# Patient Record
Sex: MALE | Race: WHITE | Employment: FULL TIME | URBAN - METROPOLITAN AREA
[De-identification: names, ages, dates, MRNs, and addresses within clinical notes are randomized per-mention and may not be internally consistent; named-entity substitution may affect disease eponyms.]

---

## 2017-03-26 DIAGNOSIS — E11.9 TYPE 2 DIABETES MELLITUS WITHOUT COMPLICATION (HCC): ICD-10-CM

## 2017-03-27 RX ORDER — SITAGLIPTIN 100 MG/1
TABLET, FILM COATED ORAL
Qty: 90 TABLET | Refills: 3 | Status: SHIPPED | OUTPATIENT
Start: 2017-03-27 | End: 2017-10-13 | Stop reason: ALTCHOICE

## 2017-03-27 RX ORDER — PIOGLITAZONEHYDROCHLORIDE 45 MG/1
TABLET ORAL
Qty: 90 TABLET | Refills: 3 | Status: SHIPPED | OUTPATIENT
Start: 2017-03-27 | End: 2017-10-13 | Stop reason: SDUPTHER

## 2017-04-04 ENCOUNTER — OFFICE VISIT (OUTPATIENT)
Dept: FAMILY MEDICINE CLINIC | Age: 47
End: 2017-04-04

## 2017-04-04 VITALS
DIASTOLIC BLOOD PRESSURE: 80 MMHG | SYSTOLIC BLOOD PRESSURE: 128 MMHG | BODY MASS INDEX: 35.03 KG/M2 | HEIGHT: 72 IN | HEART RATE: 100 BPM | RESPIRATION RATE: 16 BRPM | WEIGHT: 258.6 LBS

## 2017-04-04 DIAGNOSIS — E78.2 MIXED HYPERLIPIDEMIA: ICD-10-CM

## 2017-04-04 DIAGNOSIS — Z51.81 MEDICATION MONITORING ENCOUNTER: ICD-10-CM

## 2017-04-04 DIAGNOSIS — L84 CALLUS OF FOOT: ICD-10-CM

## 2017-04-04 DIAGNOSIS — Z79.4 TYPE 2 DIABETES MELLITUS WITHOUT COMPLICATION, WITH LONG-TERM CURRENT USE OF INSULIN (HCC): Primary | ICD-10-CM

## 2017-04-04 DIAGNOSIS — I10 ESSENTIAL HYPERTENSION: ICD-10-CM

## 2017-04-04 DIAGNOSIS — E83.52 HYPERCALCEMIA: ICD-10-CM

## 2017-04-04 DIAGNOSIS — J30.1 SEASONAL ALLERGIC RHINITIS DUE TO POLLEN: ICD-10-CM

## 2017-04-04 DIAGNOSIS — E11.9 TYPE 2 DIABETES MELLITUS WITHOUT COMPLICATION, WITH LONG-TERM CURRENT USE OF INSULIN (HCC): Primary | ICD-10-CM

## 2017-04-04 PROCEDURE — 99214 OFFICE O/P EST MOD 30 MIN: CPT | Performed by: FAMILY MEDICINE

## 2017-04-04 RX ORDER — VALSARTAN 160 MG/1
TABLET ORAL
Qty: 90 TABLET | Refills: 3 | Status: SHIPPED | OUTPATIENT
Start: 2017-04-04 | End: 2017-10-13 | Stop reason: ALTCHOICE

## 2017-04-04 RX ORDER — FENOFIBRATE 145 MG/1
145 TABLET, COATED ORAL DAILY
Qty: 90 TABLET | Refills: 1 | Status: SHIPPED | OUTPATIENT
Start: 2017-04-04 | End: 2017-09-09 | Stop reason: SDUPTHER

## 2017-04-04 RX ORDER — PIOGLITAZONEHYDROCHLORIDE 45 MG/1
45 TABLET ORAL DAILY
Qty: 90 TABLET | Refills: 1 | Status: SHIPPED | OUTPATIENT
Start: 2017-04-04 | End: 2017-10-13 | Stop reason: ALTCHOICE

## 2017-04-04 RX ORDER — SIMVASTATIN 40 MG
TABLET ORAL
Qty: 90 TABLET | Refills: 3 | Status: SHIPPED | OUTPATIENT
Start: 2017-04-04 | End: 2017-11-28 | Stop reason: ALTCHOICE

## 2017-04-04 ASSESSMENT — PATIENT HEALTH QUESTIONNAIRE - PHQ9
2. FEELING DOWN, DEPRESSED OR HOPELESS: 0
SUM OF ALL RESPONSES TO PHQ9 QUESTIONS 1 & 2: 0
SUM OF ALL RESPONSES TO PHQ QUESTIONS 1-9: 0
1. LITTLE INTEREST OR PLEASURE IN DOING THINGS: 0

## 2017-04-04 ASSESSMENT — ENCOUNTER SYMPTOMS
SHORTNESS OF BREATH: 0
GASTROINTESTINAL NEGATIVE: 1
EYE ITCHING: 1
COUGH: 0
WHEEZING: 0
ABDOMINAL PAIN: 0

## 2017-04-06 LAB
AVERAGE GLUCOSE: 194 MG/DL (ref 66–114)
CHOLESTEROL, TOTAL: 173 MG/DL
CHOLESTEROL/HDL RATIO: 3.7
CHOLESTEROL/HDL RATIO: 3.7
CHOLESTEROL: 173 MG/DL
CREATINE, URINE: 183.1 MG/DL
CREATININE, URINE: 183.1
HBA1C MFR BLD: 8.4 %
HBA1C MFR BLD: 8.4 % (ref 4.2–5.8)
HDLC SERPL-MCNC: 47 MG/DL (ref 35–70)
HDLC SERPL-MCNC: 47 MG/DL (ref 40–60)
LDL CHOLESTEROL CALCULATED: 95 MG/DL
LDL CHOLESTEROL CALCULATED: 95 MG/DL (ref 0–160)
LDL/HDL RATIO: 2
MICROALBUMIN/CREAT 24H UR: 70.8 MG/DL
MICROALBUMIN/CREAT 24H UR: 70.8 MG/G{CREAT}
MICROALBUMIN/CREAT UR-RTO: 387
MICROALBUMIN/CREAT UR-RTO: 387 UG/MG
TRIGL SERPL-MCNC: 155 MG/DL
TRIGL SERPL-MCNC: 155 MG/DL
VLDLC SERPL CALC-MCNC: 31 MG/DL
VLDLC SERPL CALC-MCNC: 31 MG/DL

## 2017-07-24 DIAGNOSIS — E11.9 TYPE 2 DIABETES MELLITUS WITHOUT COMPLICATION, WITH LONG-TERM CURRENT USE OF INSULIN (HCC): ICD-10-CM

## 2017-07-24 DIAGNOSIS — Z79.4 TYPE 2 DIABETES MELLITUS WITHOUT COMPLICATION, WITH LONG-TERM CURRENT USE OF INSULIN (HCC): ICD-10-CM

## 2017-07-28 DIAGNOSIS — E11.9 TYPE 2 DIABETES MELLITUS WITHOUT COMPLICATION (HCC): ICD-10-CM

## 2017-07-28 RX ORDER — INSULIN GLARGINE 100 [IU]/ML
INJECTION, SOLUTION SUBCUTANEOUS
Qty: 90 ML | Refills: 3 | Status: SHIPPED | OUTPATIENT
Start: 2017-07-28 | End: 2017-11-02 | Stop reason: SDUPTHER

## 2017-10-03 ENCOUNTER — TELEPHONE (OUTPATIENT)
Dept: FAMILY MEDICINE CLINIC | Age: 47
End: 2017-10-03

## 2017-10-10 LAB
BUN BLDV-MCNC: 45 MG/DL
CALCIUM SERPL-MCNC: 9.9 MG/DL
CHLORIDE BLD-SCNC: 102 MMOL/L
CO2: 25 MMOL/L
CREAT SERPL-MCNC: 3.4 MG/DL
GFR CALCULATED: 20
GLUCOSE BLD-MCNC: 139 MG/DL
POTASSIUM SERPL-SCNC: 5.2 MMOL/L
SODIUM BLD-SCNC: 137 MMOL/L

## 2017-10-13 ENCOUNTER — OFFICE VISIT (OUTPATIENT)
Dept: FAMILY MEDICINE CLINIC | Age: 47
End: 2017-10-13
Payer: COMMERCIAL

## 2017-10-13 VITALS
BODY MASS INDEX: 33.59 KG/M2 | RESPIRATION RATE: 20 BRPM | SYSTOLIC BLOOD PRESSURE: 112 MMHG | WEIGHT: 248 LBS | HEIGHT: 72 IN | DIASTOLIC BLOOD PRESSURE: 78 MMHG | TEMPERATURE: 99 F | HEART RATE: 88 BPM

## 2017-10-13 DIAGNOSIS — I10 ESSENTIAL HYPERTENSION: ICD-10-CM

## 2017-10-13 DIAGNOSIS — Z51.81 MEDICATION MONITORING ENCOUNTER: ICD-10-CM

## 2017-10-13 DIAGNOSIS — Z79.4 TYPE 2 DIABETES MELLITUS WITHOUT COMPLICATION, WITH LONG-TERM CURRENT USE OF INSULIN (HCC): Primary | ICD-10-CM

## 2017-10-13 DIAGNOSIS — E78.2 MIXED HYPERLIPIDEMIA: ICD-10-CM

## 2017-10-13 DIAGNOSIS — E11.9 TYPE 2 DIABETES MELLITUS WITHOUT COMPLICATION, WITH LONG-TERM CURRENT USE OF INSULIN (HCC): Primary | ICD-10-CM

## 2017-10-13 DIAGNOSIS — N17.9 AKI (ACUTE KIDNEY INJURY) (HCC): ICD-10-CM

## 2017-10-13 PROCEDURE — 99214 OFFICE O/P EST MOD 30 MIN: CPT | Performed by: FAMILY MEDICINE

## 2017-10-13 RX ORDER — SITAGLIPTIN 25 MG/1
100 TABLET, FILM COATED ORAL
COMMUNITY
Start: 2017-10-07 | End: 2017-11-02 | Stop reason: SINTOL

## 2017-10-13 RX ORDER — CARVEDILOL 12.5 MG/1
TABLET ORAL
COMMUNITY
Start: 2017-10-07 | End: 2017-11-28 | Stop reason: ALTCHOICE

## 2017-10-13 RX ORDER — FENOFIBRATE 160 MG/1
145 TABLET ORAL DAILY
COMMUNITY
End: 2018-02-22 | Stop reason: SDUPTHER

## 2017-10-13 RX ORDER — AMLODIPINE BESYLATE 10 MG/1
TABLET ORAL
COMMUNITY
Start: 2017-10-07 | End: 2017-11-28 | Stop reason: ALTCHOICE

## 2017-10-13 RX ORDER — VALSARTAN 160 MG/1
160 TABLET ORAL DAILY
COMMUNITY
End: 2017-10-13 | Stop reason: ALTCHOICE

## 2017-10-13 RX ORDER — ATORVASTATIN CALCIUM 20 MG/1
TABLET, FILM COATED ORAL
COMMUNITY
Start: 2017-10-07 | End: 2017-11-02 | Stop reason: CLARIF

## 2017-10-13 RX ORDER — CEFADROXIL 500 MG/1
CAPSULE ORAL
COMMUNITY
Start: 2017-10-07 | End: 2017-11-02

## 2017-10-13 RX ORDER — OXYCODONE HYDROCHLORIDE 5 MG/1
TABLET ORAL
Refills: 0 | COMMUNITY
Start: 2017-10-07 | End: 2017-11-02 | Stop reason: ALTCHOICE

## 2017-10-13 ASSESSMENT — ENCOUNTER SYMPTOMS
ROS SKIN COMMENTS: SEE HPI.
GASTROINTESTINAL NEGATIVE: 1
ALLERGIC/IMMUNOLOGIC NEGATIVE: 1
RESPIRATORY NEGATIVE: 1

## 2017-10-13 NOTE — PROGRESS NOTES
Subjective:      Patient ID: Yared Dia is a 55 y.o. male. HPI  Follow up of chronic conditions. Encounter Diagnoses   Name Primary?  Type 2 diabetes mellitus without complication, with long-term current use of insulin (HCC) Yes    Essential hypertension     Mixed hyperlipidemia     Medication monitoring encounter     OLIVA (acute kidney injury) ([facility] [address] surgery for scotal abscess      Patient is here after having been hospitalized for treatment of a scrotal abscess which had to be debrided and is now being allowed to close by secondary intention. I've spoken with his nephrologist who states that there was gangrene present as a consequence of his neglect of his diabetes. He also experienced acute renal injury secondary to the infection at one point had a creatinine around 6.0. Since then, quite a few of his medications have been stopped because of his renal injury and his diabetes is being controlled solely with basal insulin and immediate acting regular insulin. In the office today, a fingerstick A1c was 7.4%. Lab Results   Component Value Date    LABA1C 8.4 04/06/2017    LABA1C 8.4 (H) 04/05/2017    LABA1C 6.7 (H) 05/13/2016     Lab Results   Component Value Date    LDLCALC 95 04/06/2017    CREATININE 1.61 (H) 09/30/2017     HTN is stable with current medications. Pt has no medication side effects nor orthostatic symptoms. BP Readings from Last 3 Encounters:   10/13/17 112/78   04/04/17 128/80   11/30/16 (!) 150/89     Lipid values are stable currently. Cholesterol, Total (mg/dL)   Date Value   04/06/2017 173     HDL   Date Value   04/06/2017 47 mg/dL   02/27/2012 39 mg/dl     Triglycerides (mg/dL)   Date Value   04/06/2017 155     Lab Results   Component Value Date    LDLCALC 95 04/06/2017     The rest of this patient's conditions are stable. Past medical and surgical hx reviewed.   Past Medical History:   Diagnosis Date    OLIVA (acute kidney injury) ([address] surgery for scotal abscess 10/13/2017  Hyperlipidemia     Hypertension     Scrotal abscess 10/02/2017    Tobacco abuse     Type II or unspecified type diabetes mellitus without mention of complication, not stated as uncontrolled      Past Surgical History:   Procedure Laterality Date    ABSCESS DRAINAGE  10/02/2017    Scrotal    RECTAL SURGERY      fissure     Portions of this note were completed with a voice recording program.  Efforts were made to edit the dictations but occasionally words are mis-transcribed. Review of Systems   Constitutional: Negative. HENT: Negative. Respiratory: Negative. Cardiovascular: Negative. Negative for chest pain, palpitations and leg swelling. Gastrointestinal: Negative. Endocrine:        See HPI. Genitourinary:        See HPI. Musculoskeletal: Negative. Skin:        See HPI. Allergic/Immunologic: Negative. Neurological: Negative. Hematological: Negative. Psychiatric/Behavioral: Negative. All other systems reviewed and are negative. Objective:   Physical Exam   Constitutional: He is oriented to person, place, and time. He appears well-developed and well-nourished. HENT:   Right Ear: External ear normal.   Left Ear: External ear normal.   Nose: Nose normal.   Mouth/Throat: Oropharynx is clear and moist.   Eyes: Conjunctivae are normal.   Neck: No thyromegaly present. Cardiovascular: Normal rate, regular rhythm and normal heart sounds. Pulmonary/Chest: Effort normal and breath sounds normal.   Abdominal: Soft. Bowel sounds are normal.   Genitourinary:         Musculoskeletal: He exhibits no edema. Lymphadenopathy:     He has no cervical adenopathy. Neurological: He is alert and oriented to person, place, and time. Skin: Skin is warm and dry. Psychiatric: He has a normal mood and affect. Nursing note and vitals reviewed. Assessment:      1. Type 2 diabetes mellitus without complication, with long-term current use of insulin ([address]     2.  Essential hypertension     3. Mixed hyperlipidemia     4. Medication monitoring encounter     5. OLIVA (acute kidney injury) ([facility] [address] surgery for scotal abscess             Plan:      No orders of the defined types were placed in this encounter. \  Medications Discontinued During This Encounter   Medication Reason    fenofibrate (TRICOR) 145 MG tablet Dose adjustment    fenofibrate (TRICOR) 145 MG tablet Dose adjustment    valsartan (DIOVAN) 160 MG tablet Therapy completed    pioglitazone (ACTOS) 45 MG tablet Duplicate Order    JANUVIA 100 MG tablet Discontinued by another clinician    pioglitazone (ACTOS) 45 MG tablet Discontinued by another clinician    Exenatide (BYDUREON) 2 MG PEN Discontinued by another clinician    valsartan (DIOVAN) 160 MG tablet Discontinued by another clinician     Current Outpatient Prescriptions   Medication Sig Dispense Refill    amLODIPine (NORVASC) 10 MG tablet       atorvastatin (LIPITOR) 20 MG tablet       carvedilol (COREG) 12.5 MG tablet       cefadroxil (DURICEF) 500 MG capsule       oxyCODONE (ROXICODONE) 5 MG immediate release tablet TK 1 T PO Q 6 H PRN P  0    JANUVIA 25 MG tablet       fenofibrate 160 MG tablet Take 160 mg by mouth daily      Multiple Vitamins-Minerals (MULTIVITAMIN ADULTS PO) Take by mouth      insulin glargine (LANTUS SOLOSTAR) 100 UNIT/ML injection pen Inject 50 Units into the skin 2 times daily 18 Pen 2    glucose blood VI test strips (ONE TOUCH ULTRA TEST) strip Test 4x daily AD. Dx: EII. 9 on insulin 400 each 3    Insulin Pen Needle (B-D ULTRAFINE III SHORT PEN) 31G X 8 MM MISC Use as directed for insulin 200 each 3    LANTUS SOLOSTAR 100 UNIT/ML injection pen INJECT 50 UNITS UNDER THE  SKIN TWO TIMES A DAY 90 mL 3    simvastatin (ZOCOR) 40 MG tablet TAKE 1 TABLET NIGHTLY 90 tablet 3    metFORMIN (GLUCOPHAGE) 850 MG tablet TAKE 1 TABLET IN THE       MORNING AND 2 TABLETS IN   THE EVENING 270 tablet 3    acetaminophen (TYLENOL) 500 MG tablet

## 2017-10-13 NOTE — PATIENT INSTRUCTIONS
Continue current medications as ordered by Dr Renan Buckley. Continue present dressing changes. Keep record of  Home BS readings. Recheck appointment 6 weeks.

## 2017-10-16 ENCOUNTER — TELEPHONE (OUTPATIENT)
Dept: FAMILY MEDICINE CLINIC | Age: 47
End: 2017-10-16

## 2017-10-16 DIAGNOSIS — E78.2 MIXED HYPERLIPIDEMIA: ICD-10-CM

## 2017-10-16 DIAGNOSIS — Z79.4 TYPE 2 DIABETES MELLITUS WITHOUT COMPLICATION, WITH LONG-TERM CURRENT USE OF INSULIN (HCC): Primary | ICD-10-CM

## 2017-10-16 DIAGNOSIS — I10 ESSENTIAL HYPERTENSION: ICD-10-CM

## 2017-10-16 DIAGNOSIS — E11.9 TYPE 2 DIABETES MELLITUS WITHOUT COMPLICATION, WITH LONG-TERM CURRENT USE OF INSULIN (HCC): Primary | ICD-10-CM

## 2017-10-16 DIAGNOSIS — N17.9 AKI (ACUTE KIDNEY INJURY) (HCC): ICD-10-CM

## 2017-10-16 NOTE — TELEPHONE ENCOUNTER
Patient is calling in wanting to know what Dr DMITRIY MARIANO [facility] and Dr Veronica Zelaya had decided about his medication. His blood sugars are running high.   Please advise

## 2017-10-16 NOTE — TELEPHONE ENCOUNTER
We agree that he needs to be seen and managed by the diabetic clinic again. He has to have intensive monitoring of his blood sugars as well as renal function. Find out which diabetic clinic he wants to be established with.

## 2017-10-19 ENCOUNTER — TELEPHONE (OUTPATIENT)
Dept: FAMILY MEDICINE CLINIC | Age: 47
End: 2017-10-19

## 2017-11-02 ENCOUNTER — TELEPHONE (OUTPATIENT)
Dept: FAMILY MEDICINE CLINIC | Age: 47
End: 2017-11-02

## 2017-11-02 ENCOUNTER — TELEPHONE (OUTPATIENT)
Dept: PHARMACY | Age: 47
End: 2017-11-02

## 2017-11-02 ENCOUNTER — HOSPITAL ENCOUNTER (OUTPATIENT)
Dept: PHARMACY | Age: 47
Setting detail: THERAPIES SERIES
Discharge: HOME | End: 2017-11-02
Payer: COMMERCIAL

## 2017-11-02 VITALS — WEIGHT: 254.38 LBS | BODY MASS INDEX: 34.5 KG/M2

## 2017-11-02 PROCEDURE — G0108 DIAB MANAGE TRN  PER INDIV: HCPCS | Performed by: REGISTERED NURSE

## 2017-11-02 RX ORDER — PIOGLITAZONEHYDROCHLORIDE 45 MG/1
45 TABLET ORAL DAILY
COMMUNITY
End: 2018-02-22 | Stop reason: ALTCHOICE

## 2017-11-02 NOTE — TELEPHONE ENCOUNTER
elizabeth nelson from med management needs a verbal order for humalog for patient. He is in the office now.

## 2017-11-02 NOTE — PROGRESS NOTES
Diabetes Clinic at [address] [place], [address]  [phone] (phone)  [phone] (fax)    Patient ID: Meliza Arellano 1970  Referring Provider: Nikki Frias     Patient's name and  were verified. Subjective:    He presents for His follow-up diabetic visit. He has type 2 diabetes mellitus. Home regimen includes: insulin  biguanide  TZD  [id number]-[id number] He is compliant most of the time. Assessment:     Lab Results   Component Value Date    LABA1C 8.4 2017    BUN 25 2017    CREATININE 1.61 2017     There were no vitals filed for this visit. Wt Readings from Last 3 Encounters:   10/13/17 248 lb (112.5 kg)   17 258 lb 9.6 oz (117.3 kg)   16 250 lb (113.4 kg)     Ht Readings from Last 3 Encounters:   10/13/17 6' (1.829 m)   17 6' (1.829 m)   16 6' (1.829 m)       Glucose at 4 hrs PPD today resulted at 168mg/dl  Current monitoring regimen: home blood tests - 4 times daily  Home blood sugar trends:   Any episodes of hypoglycemia? no  Previous visit with dietician: yes - in past  Current diet: on average, 3 meals per day  Current exercise: none. Works [employer] alternate shifts  Eye exam current (within one year): unknown  Any history of foot problems? n/a  Last foot exam: deferred  Immunizations up to date: recommended  Taking ASA:  Yes - If not why? Appropriate for use of MyChart Glucose Grid:  Yes    Focus:     Ray Carlin shared that he had an episode of acute failure after a surgery and episode of infection within the last month. Was seen by Dr. Lila Vinson, but diabetes meds had been managed by Dr. Nikki Frias. While in hospital, all orals were stopped due to decreased kidney function. Due to uncertainty, orals were restarted by the patient. Per pharm d, stop bydureon, januvia, metformin, for now until kidney function can be re evaluated. Current GFR at 20mg. Will ask Dr. Nikki Frias to begin humalog per scale with clinic to manage and continue lantus and actos.  Future

## 2017-11-02 NOTE — TELEPHONE ENCOUNTER
Verbal order received to begin Humalog kwikpen per scale 2for BS >140mg. Clinic to titrate. Voucher provided for 5 pens. Pt instructed on how to use humalog safely. Uses [facility] [employer].

## 2017-11-08 ENCOUNTER — HOSPITAL ENCOUNTER (OUTPATIENT)
Dept: PHARMACY | Age: 47
Setting detail: THERAPIES SERIES
Discharge: HOME | End: 2017-11-08
Payer: COMMERCIAL

## 2017-11-08 VITALS
SYSTOLIC BLOOD PRESSURE: 148 MMHG | DIASTOLIC BLOOD PRESSURE: 78 MMHG | WEIGHT: 253.38 LBS | BODY MASS INDEX: 34.36 KG/M2

## 2017-11-08 DIAGNOSIS — Z79.4 TYPE 2 DIABETES MELLITUS WITH COMPLICATION, WITH LONG-TERM CURRENT USE OF INSULIN (HCC): Primary | ICD-10-CM

## 2017-11-08 DIAGNOSIS — E11.8 TYPE 2 DIABETES MELLITUS WITH COMPLICATION, WITH LONG-TERM CURRENT USE OF INSULIN (HCC): Primary | ICD-10-CM

## 2017-11-08 LAB
BUN BLDV-MCNC: 30 MG/DL
CALCIUM SERPL-MCNC: 11.7 MG/DL
CHLORIDE BLD-SCNC: 102 MMOL/L
CO2: 28 MMOL/L
CREAT SERPL-MCNC: 1.5 MG/DL
GFR CALCULATED: 50
GLUCOSE BLD-MCNC: 88 MG/DL
POTASSIUM SERPL-SCNC: 4.4 MMOL/L
SODIUM BLD-SCNC: 137 MMOL/L

## 2017-11-08 PROCEDURE — G0108 DIAB MANAGE TRN  PER INDIV: HCPCS | Performed by: REGISTERED NURSE

## 2017-11-08 NOTE — PROGRESS NOTES
Diabetes Clinic at [address] [place], [address]  [phone] (phone)  [phone] (fax)    Patient ID: Arely Juarez 1970  Referring Provider: Dr. John Elliott     Patient's name and  were verified. Subjective:    He presents for His follow-up diabetic visit. He has type 2 diabetes mellitus. Home regimen includes: insulin  TZD He is compliant most of the time. Assessment:     Lab Results   Component Value Date    LABA1C 8.4 2017    BUN 45 10/10/2017    CREATININE 3.4 10/10/2017     Vitals:    17 1306   BP: (!) 148/78   Weight: 253 lb 6 oz (114.9 kg)     Wt Readings from Last 3 Encounters:   17 253 lb 6 oz (114.9 kg)   17 254 lb 6 oz (115.4 kg)   10/13/17 248 lb (112.5 kg)     Ht Readings from Last 3 Encounters:   10/13/17 6' (1.829 m)   17 6' (1.829 m)   16 6' (1.829 m)       Glucose at 1 hrs PPD today resulted at 112mg/dl  Current monitoring regimen: home blood tests - 4 times daily. Since starting Humalog per scale      DATE FBS  HR PPD PRE-LUNCH  HR PPD PRE-DINNER  HR PPD BED   11/3 240  300  149     11-4 239  242  247      263    273      247(6) 211 247(6)  247 304(8)     219(6)  150 269(9) 278(9)      138  125/112                                             Any episodes of hypoglycemia? no  Previous visit with dietician: yes - in past  Current diet: on average, 3 meals per day, smaller portions. Does get up and eat during the night? Current exercise: walking  Eye exam current (within one year): yes. 2017- non proliferative retinopathy  Any history of foot problems? no  Last foot exam: deferred  Immunizations up to date: recommended  Taking ASA:  no - If not why? Appropriate for use of MyChart Glucose Grid:  Yes    Focus:     Mary Mcgill has been sgm and using insulin as directed per scale and BS have remained over 200mg until today. Why? .  C Peptide in  was 0.3, he is off all orals, except actos and he is eating less, but this am had a nutrigrain bar and regular starbucks cappachino (45gms). Per pharm d repeat BMP for safety. Last creatinine was 3.4 with GFR of 20mg. Will consider a base dose of humalog for a typical meal if BS go back up. Teaching done rt pattern management, renal disease, safety, and travel. Will be in [address] in early December. Agreeable to plan. DSME PLAN:   Discussed general issues about diabetes pathophysiology and management. Counseling at today's visit: focused on the need to adhere to the prescribed ADA diet and reminded to check sugars regularly and to bring readings in at the time of the next visit. Discussed ways to avoid symptomatic hypoglycemia. Discussed sick day management. Meter download, medications, PMH and nursing assessment reviewed with ROBERT Cruz, Pharm D. [facility] states He is willing to participate in this plan of care and verbalized understanding of all instructions provided. Teach back used to verify comprehension. Total time involved in direct patient education: 30 minutes. BMP order to be done ASAP   1. Continue to check the blood sugars before meals and bedtime. 2.  Continue same insulin and actos until we get updated chemistry. 3.  B/P 148/78.  4. In future if you continue to need scale after meals, may need to start 6units before meals if eating carbohydrates. 5.  Continue to use scale 2, #3 if BS are running consistently over 200mg. 6.  Call if any concerns. [facility] would appreciate an update after you see Dr. Monica Cade. Could he fax a copy of his note to Dr. Katelyn De Guzman or Diabetes Clinic for the chart.   RN 8 weeks

## 2017-11-09 LAB
ANION GAP SERPL CALCULATED.3IONS-SCNC: 11 MMOL/L
BUN BLDV-MCNC: 30 MG/DL (ref 10–20)
CALCIUM SERPL-MCNC: 11.7 MG/DL (ref 8.7–10.8)
CHLORIDE BLD-SCNC: 102 MMOL/L (ref 95–111)
CO2: 28 MMOL/L (ref 21–32)
CREAT SERPL-MCNC: 1.5 MG/DL (ref 0.5–1.3)
EGFR AFRICAN AMERICAN: 61
EGFR IF NONAFRICAN AMERICAN: 50
GLUCOSE: 88 MG/DL (ref 70–100)
POTASSIUM SERPL-SCNC: 4.4 MMOL/L (ref 3.5–5.4)
SODIUM BLD-SCNC: 137 MMOL/L (ref 134–147)

## 2017-11-28 ENCOUNTER — OFFICE VISIT (OUTPATIENT)
Dept: FAMILY MEDICINE CLINIC | Age: 47
End: 2017-11-28
Payer: COMMERCIAL

## 2017-11-28 VITALS
BODY MASS INDEX: 37.72 KG/M2 | SYSTOLIC BLOOD PRESSURE: 122 MMHG | HEIGHT: 71 IN | WEIGHT: 269.4 LBS | RESPIRATION RATE: 20 BRPM | HEART RATE: 100 BPM | DIASTOLIC BLOOD PRESSURE: 78 MMHG

## 2017-11-28 DIAGNOSIS — Z51.81 MEDICATION MONITORING ENCOUNTER: ICD-10-CM

## 2017-11-28 DIAGNOSIS — E78.2 MIXED HYPERLIPIDEMIA: ICD-10-CM

## 2017-11-28 DIAGNOSIS — Z79.4 TYPE 2 DIABETES MELLITUS WITHOUT COMPLICATION, WITH LONG-TERM CURRENT USE OF INSULIN (HCC): Primary | ICD-10-CM

## 2017-11-28 DIAGNOSIS — I10 ESSENTIAL HYPERTENSION: ICD-10-CM

## 2017-11-28 DIAGNOSIS — E11.9 TYPE 2 DIABETES MELLITUS WITHOUT COMPLICATION, WITH LONG-TERM CURRENT USE OF INSULIN (HCC): Primary | ICD-10-CM

## 2017-11-28 PROCEDURE — 3045F PR MOST RECENT HEMOGLOBIN A1C LEVEL 7.0-9.0%: CPT | Performed by: FAMILY MEDICINE

## 2017-11-28 PROCEDURE — G8417 CALC BMI ABV UP PARAM F/U: HCPCS | Performed by: FAMILY MEDICINE

## 2017-11-28 PROCEDURE — G8484 FLU IMMUNIZE NO ADMIN: HCPCS | Performed by: FAMILY MEDICINE

## 2017-11-28 PROCEDURE — 4004F PT TOBACCO SCREEN RCVD TLK: CPT | Performed by: FAMILY MEDICINE

## 2017-11-28 PROCEDURE — 99214 OFFICE O/P EST MOD 30 MIN: CPT | Performed by: FAMILY MEDICINE

## 2017-11-28 PROCEDURE — G8427 DOCREV CUR MEDS BY ELIG CLIN: HCPCS | Performed by: FAMILY MEDICINE

## 2017-11-28 ASSESSMENT — ENCOUNTER SYMPTOMS
GASTROINTESTINAL NEGATIVE: 1
ALLERGIC/IMMUNOLOGIC NEGATIVE: 1
RESPIRATORY NEGATIVE: 1

## 2017-11-28 NOTE — PROGRESS NOTES
reviewed. Past Medical History:   Diagnosis Date    OLIVA (acute kidney injury) ([facility] [address] surgery for scotal abscess 10/13/2017    Hyperlipidemia     Hypertension     Scrotal abscess 10/02/2017    Tobacco abuse     Type II or unspecified type diabetes mellitus without mention of complication, not stated as uncontrolled      Past Surgical History:   Procedure Laterality Date    ABSCESS DRAINAGE  10/02/2017    Scrotal    RECTAL SURGERY      fissure     Portions of this note were completed with a voice recording program.  Efforts were made to edit the dictations but occasionally words are mis-transcribed. Review of Systems   Constitutional: Negative. HENT: Negative. Respiratory: Negative. Cardiovascular: Negative. Gastrointestinal: Negative. Endocrine: Negative. Negative for polydipsia, polyphagia and polyuria. Genitourinary: Negative. Negative for difficulty urinating, frequency and urgency. Musculoskeletal: Positive for arthralgias. Skin: Negative. Negative for rash. Allergic/Immunologic: Negative. Neurological: Negative. Negative for dizziness, light-headedness and headaches. Hematological: Negative. Psychiatric/Behavioral: Negative. All other systems reviewed and are negative. Objective:   Physical Exam   Constitutional: He is oriented to person, place, and time. He appears well-developed and well-nourished. HENT:   Right Ear: External ear normal.   Left Ear: External ear normal.   Nose: Nose normal.   Mouth/Throat: Oropharynx is clear and moist.   Eyes: Conjunctivae are normal.   Neck: No thyromegaly present. Cardiovascular: Normal rate, regular rhythm, normal heart sounds and intact distal pulses. Exam reveals no gallop. No murmur heard. Pulmonary/Chest: Effort normal and breath sounds normal.   Abdominal: Soft. Bowel sounds are normal.   Musculoskeletal: He exhibits no edema. Lymphadenopathy:     He has no cervical adenopathy.    Neurological: He is alert and oriented to person, place, and time. No cranial nerve deficit. Skin: Skin is warm and dry. No rash noted. Psychiatric: He has a normal mood and affect. Nursing note and vitals reviewed. Assessment:      1. Type 2 diabetes mellitus without complication, with long-term current use of insulin (HCC)  Hemoglobin A1C   2. Essential hypertension     3. Mixed hyperlipidemia     4. Medication monitoring encounter  Hemoglobin A1C           Plan:      Orders Placed This Encounter   Procedures    Hemoglobin A1C     STANDING ORDER: A1c every 3 months for 1 yr. Dx: E11.9  DM2 without complications. Standing Status:   Future     Standing Expiration Date:   11/28/2018     Medications Discontinued During This Encounter   Medication Reason    amLODIPine (NORVASC) 10 MG tablet Therapy completed    carvedilol (COREG) 12.5 MG tablet Therapy completed    metFORMIN (GLUCOPHAGE) 850 MG tablet Therapy completed    simvastatin (ZOCOR) 40 MG tablet Therapy completed     Current Outpatient Prescriptions   Medication Sig Dispense Refill    pioglitazone (ACTOS) 45 MG tablet Take 45 mg by mouth daily      insulin lispro (HUMALOG KWIKPEN) 100 UNIT/ML pen Inject per group 2 sliding scale three times daily 5 Pen 3    fenofibrate 160 MG tablet Take 160 mg by mouth daily      Multiple Vitamins-Minerals (MULTIVITAMIN ADULTS PO) Take by mouth      insulin glargine (LANTUS SOLOSTAR) 100 UNIT/ML injection pen Inject 50 Units into the skin 2 times daily 18 Pen 2    Insulin Pen Needle (B-D ULTRAFINE III SHORT PEN) 31G X 8 MM MISC Use as directed for insulin 200 each 3    acetaminophen (TYLENOL) 500 MG tablet Take 1,000 mg by mouth every 6 hours as needed for Pain       No current facility-administered medications for this visit. Continue present medications. Keep tracking with diabetes clinic. Discussed use, benefit, and side effects of prescribed medications. Barriers to compliance discussed.   All patient

## 2017-11-28 NOTE — PROGRESS NOTES
Chronic Disease Visit Information    BP Readings from Last 3 Encounters:   11/08/17 (!) 148/78   10/13/17 112/78   04/04/17 128/80          Hemoglobin A1C (%)   Date Value   04/06/2017 8.4   04/05/2017 8.4 (H)   05/13/2016 6.7 (H)     LDL Calculated (mg/dL)   Date Value   04/06/2017 95     HDL   Date Value   04/06/2017 47 mg/dL   02/27/2012 39 mg/dl     BUN (mg/dl)   Date Value   11/08/2017 30 (H)     CREATININE (mg/dl)   Date Value   11/08/2017 1.5 (H)     Glucose (mg/dL)   Date Value   11/08/2017 88            Have you changed or started any medications since your last visit including any over-the-counter medicines, vitamins, or herbal medicines? no   Are you having any side effects from any of your medications? -  no  Have you stopped taking any of your medications? Is so, why? -  yes - tx complete    Have you seen any other physician or provider since your last visit? Yes - Records Obtained  Have you had any other diagnostic tests since your last visit? Yes - Records Obtained  Have you been seen in the emergency room and/or had an admission to a hospital since we last saw you? No   Have you had your annual diabetic retinal (eye) exam? Yes - Records Requested  Have you had your routine dental cleaning in the past 6 months? No     Have you activated your [employer] account? If not, what are your barriers?  Yes     Patient Care Team:  Alva Srinivasan MD as PCP - General (Family Medicine)         Medical History Review  Past Medical, Family, and Social History reviewed and does contribute to the patient presenting condition    Health Maintenance   Topic Date Due    DTaP/Tdap/Td vaccine (1 - Tdap) 11/08/1989    Pneumococcal med risk (1 of 1 - PPSV23) 11/08/1989    Flu vaccine (1) 09/01/2017    Diabetic retinal exam  01/25/2018    Diabetic foot exam  04/04/2018    Diabetic hemoglobin A1C test  04/06/2018    Diabetic microalbuminuria test  04/06/2018    Lipid screen  04/06/2018    HIV screen  Addressed

## 2017-12-21 ENCOUNTER — OFFICE VISIT (OUTPATIENT)
Dept: FAMILY MEDICINE CLINIC | Age: 47
End: 2017-12-21
Payer: COMMERCIAL

## 2017-12-21 VITALS
BODY MASS INDEX: 35.78 KG/M2 | HEIGHT: 72 IN | TEMPERATURE: 98 F | HEART RATE: 84 BPM | DIASTOLIC BLOOD PRESSURE: 70 MMHG | SYSTOLIC BLOOD PRESSURE: 116 MMHG | RESPIRATION RATE: 16 BRPM | WEIGHT: 264.2 LBS

## 2017-12-21 DIAGNOSIS — J01.90 ACUTE RHINOSINUSITIS: Primary | ICD-10-CM

## 2017-12-21 PROCEDURE — 4004F PT TOBACCO SCREEN RCVD TLK: CPT | Performed by: FAMILY MEDICINE

## 2017-12-21 PROCEDURE — G8484 FLU IMMUNIZE NO ADMIN: HCPCS | Performed by: FAMILY MEDICINE

## 2017-12-21 PROCEDURE — G8427 DOCREV CUR MEDS BY ELIG CLIN: HCPCS | Performed by: FAMILY MEDICINE

## 2017-12-21 PROCEDURE — G8417 CALC BMI ABV UP PARAM F/U: HCPCS | Performed by: FAMILY MEDICINE

## 2017-12-21 PROCEDURE — 99213 OFFICE O/P EST LOW 20 MIN: CPT | Performed by: FAMILY MEDICINE

## 2017-12-21 RX ORDER — AMOXICILLIN AND CLAVULANATE POTASSIUM 875; 125 MG/1; MG/1
1 TABLET, FILM COATED ORAL 2 TIMES DAILY
Qty: 14 TABLET | Refills: 0 | Status: SHIPPED | OUTPATIENT
Start: 2017-12-21 | End: 2017-12-28

## 2017-12-21 RX ORDER — VALSARTAN 160 MG/1
TABLET ORAL
COMMUNITY
Start: 2017-12-15 | End: 2018-02-22 | Stop reason: SDUPTHER

## 2017-12-21 ASSESSMENT — ENCOUNTER SYMPTOMS
COUGH: 1
SINUS PAIN: 1
SINUS PRESSURE: 1
WHEEZING: 0
SHORTNESS OF BREATH: 0
GASTROINTESTINAL NEGATIVE: 1

## 2017-12-21 NOTE — PROGRESS NOTES
Subjective:      Patient ID: Saskia Mejia is a 52 y.o. male. HPI:    Chief Complaint   Patient presents with    Head Congestion    Chest Congestion    Cough     Pt here for head and sinus congestion for the last 2 weeks. Started in sinus and now moving to chest.  No fevers. Using Sudafed and Afrin NS. No wheezing. Patient Active Problem List   Diagnosis    DM [facility]diabetes mellitus[facility] ([facility] [address]    HTN (hypertension)    Hyperlipidemia    Medication monitoring encounter    Callus of foot, bilateral    Hypercalcemia    Allergic rhinitis    OLIVA (acute kidney injury) ([facility] [address] surgery for scotal abscess     Past Surgical History:   Procedure Laterality Date    ABSCESS DRAINAGE  10/02/2017    Scrotal    RECTAL SURGERY      fissure     Prior to Admission medications    Medication Sig Start Date End Date Taking? Authorizing Provider   SIMVASTATIN PO Take by mouth   Yes Historical Provider, MD   valsartan (DIOVAN) 160 MG tablet  12/15/17  Yes Historical Provider, MD   pioglitazone (ACTOS) 45 MG tablet Take 45 mg by mouth daily   Yes Historical Provider, MD   insulin lispro (HUMALOG KWIKPEN) 100 UNIT/ML pen Inject per group 2 sliding scale three times daily 11/2/17  Yes Radha Evans MD   fenofibrate 160 MG tablet Take 160 mg by mouth daily   Yes Historical Provider, MD   Multiple Vitamins-Minerals (MULTIVITAMIN ADULTS PO) Take by mouth   Yes Historical Provider, MD   insulin glargine (LANTUS SOLOSTAR) 100 UNIT/ML injection pen Inject 50 Units into the skin 2 times daily 4/4/17  Yes aRdha Evans MD   Insulin Pen Needle (B-D ULTRAFINE III SHORT PEN) 31G X 8 MM MISC Use as directed for insulin 11/30/15  Yes Radha Evans MD   acetaminophen (TYLENOL) 500 MG tablet Take 1,000 mg by mouth every 6 hours as needed for Pain    Historical Provider, MD         Review of Systems   Constitutional: Negative. Negative for fever. HENT: Positive for congestion, sinus pain and sinus pressure. Respiratory: Positive for cough.

## 2018-01-02 ENCOUNTER — HOSPITAL ENCOUNTER (OUTPATIENT)
Dept: PHARMACY | Age: 48
Setting detail: THERAPIES SERIES
Discharge: HOME | End: 2018-01-02
Payer: COMMERCIAL

## 2018-01-02 VITALS — WEIGHT: 275 LBS | BODY MASS INDEX: 37.3 KG/M2

## 2018-01-02 PROCEDURE — G0108 DIAB MANAGE TRN  PER INDIV: HCPCS | Performed by: REGISTERED NURSE

## 2018-01-02 NOTE — PROGRESS NOTES
ordered, cusing greater fluctuations and inconsistency in his BS. He does get hungry after supper and wants a snack. Suggestion to stay at 30gms, like a sandwich and if over 30gms or hyperglycemia at bedtime, take humalog and lantus as directed. Reviewed record keeping, BS goals, potential for weight gain, and basic DSME. Agreed to plan. DSME PLAN:   Discussed general issues about diabetes pathophysiology and management. Counseling at today's visit: discussed the need for weight loss, focused on the need for regular aerobic exercise, focused on the need to adhere to the prescribed ADA diet, discussed the advantages of a diet low in carbohydrates and reminded to check sugars regularly and to bring readings in at the time of the next visit. Meter download, medications, PMH and nursing assessment reviewed. Shashi Curtis states He is willing to participate in this plan of care and verbalized understanding of all instructions provided. Teach back used to verify comprehension. Total time involved in direct patient education: 60 minutes. 1.  Lantus 40units 6a-8a and nighttime 8p. 2.  Breakfast 6a-8a (25-35gms of carb)        6units plus scale         Lunch  12-2p (if on a work day and you have eaten before noon) check between 1-2p (25-35gms of carb)        6units         Supper 5-6p ( 50-60gms of carb)        10-12units plus scale         BEDTIME- Scale if over 180mg. And/or 6units of humalog if eating over 30gms of carb. Try a sandwich and a sugar free beverage.     GROUP 3     Blood Sugar Dose fast acting insulin    None   140-199 3 unit   200-249 6 units   250-299 9 units   300-349 12 units   350-399 15 units   400 and above 18 units     ** AS EXERCISES INCREASES, BLOOD SUGARS COME DOWN QUICKER. 3.  Check your blood sugar before meals and bedtime. Follow above plan. Use your log book to keep track. 4.  Try to stay after work 2-3 days per week and use workout gym. F/u 4 weeks.   Does

## 2018-01-05 ENCOUNTER — TELEPHONE (OUTPATIENT)
Dept: FAMILY MEDICINE CLINIC | Age: 48
End: 2018-01-05

## 2018-01-16 LAB
AVERAGE GLUCOSE: 180 MG/DL (ref 66–114)
HBA1C MFR BLD: 7.9 % (ref 4.2–5.8)

## 2018-01-31 ENCOUNTER — TELEPHONE (OUTPATIENT)
Dept: FAMILY MEDICINE CLINIC | Age: 48
End: 2018-01-31

## 2018-01-31 ENCOUNTER — HOSPITAL ENCOUNTER (OUTPATIENT)
Dept: PHARMACY | Age: 48
Setting detail: THERAPIES SERIES
Discharge: HOME | End: 2018-01-31
Payer: COMMERCIAL

## 2018-01-31 VITALS — WEIGHT: 277 LBS | BODY MASS INDEX: 37.57 KG/M2

## 2018-01-31 PROCEDURE — G0108 DIAB MANAGE TRN  PER INDIV: HCPCS | Performed by: REGISTERED NURSE

## 2018-01-31 NOTE — PROGRESS NOTES
kidneys have returned to normal. Plan to stop actos and evaluate. May considering restarting metformin and/or sglt2, if needed. Will report sgm in 2 weeks. Set goal to eat healthier. Lower carbs and calories. Wife will assist. Clinic available to assist as needed. Agreed to plan. DSME PLAN:   Discussed general issues about diabetes pathophysiology and management. Counseling at today's visit: discussed the need for weight loss, focused on the need for regular aerobic exercise, discussed the advantages of a diet low in carbohydrates and reminded to check sugars regularly and to bring readings in at the time of the next visit. Addressed ADA diet. Increased dose of insulin: lantus. Meter download, medications, PMH and nursing assessment reviewed. Eli Regaladocarl states He is willing to participate in this plan of care and verbalized understanding of all instructions provided. Teach back used to verify comprehension. Total time involved in direct patient education: 60 minutes. 1.  Stop the actos. It will stay in the system for about 2 weeks. If you see BS go high and stay high, call/email [facility] in 2 weeks. 2.  Raise lantus to 45units twice daily. 3.  Keep your humalog base at 10-15units breakfast and lunch, supper 15-20units plus scale. 4.  Try to read labels and plan meals for <60gms. Avoid deep fried foods and look for ways to enhance cooking flavor and lower calories. 5.  [employer]. org has recipes or look up diabetic recipes or light and healthy cooking. 6.  Meter BS at bedtime for better evaluation of this area. 7.  Walk on weekends with your wife. 8.  Watch for hidden calories in foods. 9.  Current doses lantus -6 boxes and humalog 4-5boxes. 10. [email or web address] and phone is [phone].   RN 8 weeks

## 2018-02-15 ENCOUNTER — TELEPHONE (OUTPATIENT)
Dept: PHARMACY | Age: 48
End: 2018-02-15

## 2018-02-22 ENCOUNTER — OFFICE VISIT (OUTPATIENT)
Dept: FAMILY MEDICINE CLINIC | Age: 48
End: 2018-02-22
Payer: COMMERCIAL

## 2018-02-22 VITALS
HEART RATE: 90 BPM | BODY MASS INDEX: 39.16 KG/M2 | WEIGHT: 279.7 LBS | DIASTOLIC BLOOD PRESSURE: 74 MMHG | HEIGHT: 71 IN | RESPIRATION RATE: 14 BRPM | OXYGEN SATURATION: 98 % | SYSTOLIC BLOOD PRESSURE: 128 MMHG

## 2018-02-22 DIAGNOSIS — E11.9 TYPE 2 DIABETES MELLITUS WITHOUT COMPLICATION, WITH LONG-TERM CURRENT USE OF INSULIN (HCC): Primary | ICD-10-CM

## 2018-02-22 DIAGNOSIS — E78.2 MIXED HYPERLIPIDEMIA: ICD-10-CM

## 2018-02-22 DIAGNOSIS — Z79.4 TYPE 2 DIABETES MELLITUS WITHOUT COMPLICATION, WITH LONG-TERM CURRENT USE OF INSULIN (HCC): Primary | ICD-10-CM

## 2018-02-22 DIAGNOSIS — Z51.81 MEDICATION MONITORING ENCOUNTER: ICD-10-CM

## 2018-02-22 DIAGNOSIS — I10 ESSENTIAL HYPERTENSION: ICD-10-CM

## 2018-02-22 PROCEDURE — 3045F PR MOST RECENT HEMOGLOBIN A1C LEVEL 7.0-9.0%: CPT | Performed by: FAMILY MEDICINE

## 2018-02-22 PROCEDURE — G8417 CALC BMI ABV UP PARAM F/U: HCPCS | Performed by: FAMILY MEDICINE

## 2018-02-22 PROCEDURE — G8427 DOCREV CUR MEDS BY ELIG CLIN: HCPCS | Performed by: FAMILY MEDICINE

## 2018-02-22 PROCEDURE — G8484 FLU IMMUNIZE NO ADMIN: HCPCS | Performed by: FAMILY MEDICINE

## 2018-02-22 PROCEDURE — 4004F PT TOBACCO SCREEN RCVD TLK: CPT | Performed by: FAMILY MEDICINE

## 2018-02-22 PROCEDURE — 99214 OFFICE O/P EST MOD 30 MIN: CPT | Performed by: FAMILY MEDICINE

## 2018-02-22 RX ORDER — SIMVASTATIN 40 MG
40 TABLET ORAL NIGHTLY
Qty: 90 TABLET | Refills: 3 | Status: SHIPPED | OUTPATIENT
Start: 2018-02-22 | End: 2018-12-24 | Stop reason: SDUPTHER

## 2018-02-22 RX ORDER — INSULIN LISPRO 100 [IU]/ML
INJECTION, SOLUTION INTRAVENOUS; SUBCUTANEOUS
COMMUNITY
Start: 2018-01-28 | End: 2018-02-22 | Stop reason: SDUPTHER

## 2018-02-22 RX ORDER — VALSARTAN 160 MG/1
160 TABLET ORAL DAILY
Qty: 90 TABLET | Refills: 3 | Status: SHIPPED | OUTPATIENT
Start: 2018-02-22 | End: 2018-12-24 | Stop reason: SDUPTHER

## 2018-02-22 RX ORDER — FENOFIBRATE 145 MG/1
145 TABLET, COATED ORAL DAILY
Qty: 90 TABLET | Refills: 3 | Status: SHIPPED | OUTPATIENT
Start: 2018-02-22 | End: 2018-12-24 | Stop reason: SDUPTHER

## 2018-02-22 RX ORDER — INSULIN LISPRO 100 [IU]/ML
INJECTION, SOLUTION INTRAVENOUS; SUBCUTANEOUS
Qty: 5 PEN | Refills: 5 | Status: SHIPPED | OUTPATIENT
Start: 2018-02-22 | End: 2018-05-31 | Stop reason: SDUPTHER

## 2018-02-22 ASSESSMENT — ENCOUNTER SYMPTOMS
RESPIRATORY NEGATIVE: 1
ALLERGIC/IMMUNOLOGIC NEGATIVE: 1
GASTROINTESTINAL NEGATIVE: 1

## 2018-02-22 NOTE — PROGRESS NOTES
Subjective:      Patient ID: Francisca Mahoney is a 52 y.o. male. HPI  Follow up of chronic conditions. Encounter Diagnoses   Name Primary?  Type 2 diabetes mellitus without complication, with long-term current use of insulin ([facility] [address] Yes    Essential hypertension     Mixed hyperlipidemia     Medication monitoring encounter      Julio Blakely has been following with the diabetic clinic trying to get his blood sugars under better control. He has been started on insulin and for this reason, I will stop Actos since he is also gaining weight. He did not have any home blood sugars for my review but states that his average readings have been around 150. Had no hypoglycemic episodes. 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 Wt Readings from Last 3 Encounters:   02/22/18 279 lb 11.2 oz (126.9 kg)   01/31/18 277 lb (125.6 kg)   01/02/18 275 lb (124.7 kg)   Body mass index is 38.74 kg/m². HTN is stable with current medications. Pt has no medication side effects nor orthostatic symptoms. BP Readings from Last 3 Encounters:   02/22/18 128/74   12/21/17 116/70   11/28/17 122/78     Orders for repeat lipid panel will be given to him today along with microalbumin. Neither of these have been evaluated for the last 10-12 months.   Cholesterol, Total (mg/dL)   Date Value   04/06/2017 173     HDL   Date Value   04/06/2017 47 mg/dL   02/27/2012 39 mg/dl     Triglycerides (mg/dL)   Date Value   04/06/2017 155     Lab Results   Component Value Date    LDLCALC 95 04/06/2017     The rest of this patient's nightly 90 tablet 3    HUMALOG KWIKPEN 100 UNIT/ML pen Per sliding scale from diabetic clinic. 5 pen 5    Multiple Vitamins-Minerals (MULTIVITAMIN ADULTS PO) Take by mouth      acetaminophen (TYLENOL) 500 MG tablet Take 1,000 mg by mouth every 6 hours as needed for Pain      Insulin Pen Needle (B-D ULTRAFINE III SHORT PEN) 31G X 8 MM MISC Use as directed for insulin 200 each 3     No current facility-administered medications for this visit. Continue present medications. Stop Actos and continue insulin. Walk as much as possible. Discussed use, benefit, and side effects of prescribed medications. Barriers to compliance discussed. All patient questions answered. Pt voiced understanding.

## 2018-04-06 ENCOUNTER — TELEPHONE (OUTPATIENT)
Dept: INTERNAL MEDICINE CLINIC | Age: 48
End: 2018-04-06

## 2018-05-15 ENCOUNTER — TELEPHONE (OUTPATIENT)
Dept: FAMILY MEDICINE CLINIC | Age: 48
End: 2018-05-15

## 2018-05-24 ENCOUNTER — OFFICE VISIT (OUTPATIENT)
Dept: FAMILY MEDICINE CLINIC | Age: 48
End: 2018-05-24
Payer: COMMERCIAL

## 2018-05-24 VITALS
HEART RATE: 110 BPM | HEIGHT: 71 IN | BODY MASS INDEX: 39.42 KG/M2 | WEIGHT: 281.6 LBS | RESPIRATION RATE: 14 BRPM | SYSTOLIC BLOOD PRESSURE: 124 MMHG | OXYGEN SATURATION: 98 % | DIASTOLIC BLOOD PRESSURE: 84 MMHG

## 2018-05-24 DIAGNOSIS — I10 ESSENTIAL HYPERTENSION: ICD-10-CM

## 2018-05-24 DIAGNOSIS — E78.2 MIXED HYPERLIPIDEMIA: ICD-10-CM

## 2018-05-24 DIAGNOSIS — Z51.81 MEDICATION MONITORING ENCOUNTER: ICD-10-CM

## 2018-05-24 DIAGNOSIS — E11.9 TYPE 2 DIABETES MELLITUS WITHOUT COMPLICATION, WITH LONG-TERM CURRENT USE OF INSULIN (HCC): Primary | ICD-10-CM

## 2018-05-24 DIAGNOSIS — Z79.4 TYPE 2 DIABETES MELLITUS WITHOUT COMPLICATION, WITH LONG-TERM CURRENT USE OF INSULIN (HCC): Primary | ICD-10-CM

## 2018-05-24 PROBLEM — N17.9 AKI (ACUTE KIDNEY INJURY) (HCC): Status: RESOLVED | Noted: 2017-10-13 | Resolved: 2018-05-24

## 2018-05-24 LAB
AVERAGE GLUCOSE: 209 MG/DL (ref 66–114)
CHOLESTEROL/HDL RATIO: 6.4
CHOLESTEROL: 191 MG/DL
HBA1C MFR BLD: 8.9 % (ref 4.2–5.8)
HDLC SERPL-MCNC: 30 MG/DL
LDL CHOLESTEROL CALCULATED: 82 MG/DL
LDL/HDL RATIO: 2.7
TRIGL SERPL-MCNC: 393 MG/DL
VLDLC SERPL CALC-MCNC: 79 MG/DL

## 2018-05-24 PROCEDURE — 4004F PT TOBACCO SCREEN RCVD TLK: CPT | Performed by: FAMILY MEDICINE

## 2018-05-24 PROCEDURE — 3045F PR MOST RECENT HEMOGLOBIN A1C LEVEL 7.0-9.0%: CPT | Performed by: FAMILY MEDICINE

## 2018-05-24 PROCEDURE — G8417 CALC BMI ABV UP PARAM F/U: HCPCS | Performed by: FAMILY MEDICINE

## 2018-05-24 PROCEDURE — 2022F DILAT RTA XM EVC RTNOPTHY: CPT | Performed by: FAMILY MEDICINE

## 2018-05-24 PROCEDURE — 99214 OFFICE O/P EST MOD 30 MIN: CPT | Performed by: FAMILY MEDICINE

## 2018-05-24 PROCEDURE — G8427 DOCREV CUR MEDS BY ELIG CLIN: HCPCS | Performed by: FAMILY MEDICINE

## 2018-05-24 ASSESSMENT — PATIENT HEALTH QUESTIONNAIRE - PHQ9
SUM OF ALL RESPONSES TO PHQ9 QUESTIONS 1 & 2: 0
SUM OF ALL RESPONSES TO PHQ QUESTIONS 1-9: 0
2. FEELING DOWN, DEPRESSED OR HOPELESS: 0
1. LITTLE INTEREST OR PLEASURE IN DOING THINGS: 0

## 2018-05-24 ASSESSMENT — ENCOUNTER SYMPTOMS
ALLERGIC/IMMUNOLOGIC NEGATIVE: 1
GASTROINTESTINAL NEGATIVE: 1
RESPIRATORY NEGATIVE: 1

## 2018-05-31 DIAGNOSIS — Z79.4 TYPE 2 DIABETES MELLITUS WITHOUT COMPLICATION, WITH LONG-TERM CURRENT USE OF INSULIN (HCC): ICD-10-CM

## 2018-05-31 DIAGNOSIS — E11.9 TYPE 2 DIABETES MELLITUS WITHOUT COMPLICATION, WITH LONG-TERM CURRENT USE OF INSULIN (HCC): ICD-10-CM

## 2018-05-31 RX ORDER — INSULIN LISPRO 100 [IU]/ML
INJECTION, SOLUTION INTRAVENOUS; SUBCUTANEOUS
Qty: 90 ML | Refills: 3 | Status: SHIPPED | OUTPATIENT
Start: 2018-05-31 | End: 2018-12-24 | Stop reason: SDUPTHER

## 2018-06-04 ENCOUNTER — TELEPHONE (OUTPATIENT)
Dept: FAMILY MEDICINE CLINIC | Age: 48
End: 2018-06-04

## 2018-12-13 ENCOUNTER — TELEPHONE (OUTPATIENT)
Dept: FAMILY MEDICINE CLINIC | Age: 48
End: 2018-12-13

## 2018-12-13 DIAGNOSIS — Z51.81 MEDICATION MONITORING ENCOUNTER: ICD-10-CM

## 2018-12-13 DIAGNOSIS — Z79.4 TYPE 2 DIABETES MELLITUS WITHOUT COMPLICATION, WITH LONG-TERM CURRENT USE OF INSULIN (HCC): Primary | ICD-10-CM

## 2018-12-13 DIAGNOSIS — E11.9 TYPE 2 DIABETES MELLITUS WITHOUT COMPLICATION, WITH LONG-TERM CURRENT USE OF INSULIN (HCC): Primary | ICD-10-CM

## 2018-12-14 LAB
AVERAGE GLUCOSE: 229 MG/DL (ref 66–114)
HBA1C MFR BLD: 9.6 %

## 2018-12-24 ENCOUNTER — OFFICE VISIT (OUTPATIENT)
Dept: FAMILY MEDICINE CLINIC | Age: 48
End: 2018-12-24
Payer: COMMERCIAL

## 2018-12-24 VITALS
RESPIRATION RATE: 16 BRPM | SYSTOLIC BLOOD PRESSURE: 136 MMHG | DIASTOLIC BLOOD PRESSURE: 74 MMHG | HEIGHT: 72 IN | HEART RATE: 80 BPM | WEIGHT: 274.8 LBS | BODY MASS INDEX: 37.22 KG/M2

## 2018-12-24 DIAGNOSIS — Z00.00 WELL ADULT EXAM: Primary | ICD-10-CM

## 2018-12-24 DIAGNOSIS — E83.52 HYPERCALCEMIA: ICD-10-CM

## 2018-12-24 DIAGNOSIS — Z79.4 TYPE 2 DIABETES MELLITUS WITHOUT COMPLICATION, WITH LONG-TERM CURRENT USE OF INSULIN (HCC): ICD-10-CM

## 2018-12-24 DIAGNOSIS — J30.1 SEASONAL ALLERGIC RHINITIS DUE TO POLLEN: ICD-10-CM

## 2018-12-24 DIAGNOSIS — E11.9 TYPE 2 DIABETES MELLITUS WITHOUT COMPLICATION, WITH LONG-TERM CURRENT USE OF INSULIN (HCC): ICD-10-CM

## 2018-12-24 DIAGNOSIS — Z51.81 MEDICATION MONITORING ENCOUNTER: ICD-10-CM

## 2018-12-24 DIAGNOSIS — I10 ESSENTIAL HYPERTENSION: ICD-10-CM

## 2018-12-24 DIAGNOSIS — E78.2 MIXED HYPERLIPIDEMIA: ICD-10-CM

## 2018-12-24 PROCEDURE — G8484 FLU IMMUNIZE NO ADMIN: HCPCS | Performed by: FAMILY MEDICINE

## 2018-12-24 PROCEDURE — G8427 DOCREV CUR MEDS BY ELIG CLIN: HCPCS | Performed by: FAMILY MEDICINE

## 2018-12-24 PROCEDURE — G8417 CALC BMI ABV UP PARAM F/U: HCPCS | Performed by: FAMILY MEDICINE

## 2018-12-24 PROCEDURE — 4004F PT TOBACCO SCREEN RCVD TLK: CPT | Performed by: FAMILY MEDICINE

## 2018-12-24 PROCEDURE — 99396 PREV VISIT EST AGE 40-64: CPT | Performed by: FAMILY MEDICINE

## 2018-12-24 PROCEDURE — 3046F HEMOGLOBIN A1C LEVEL >9.0%: CPT | Performed by: FAMILY MEDICINE

## 2018-12-24 PROCEDURE — 2022F DILAT RTA XM EVC RTNOPTHY: CPT | Performed by: FAMILY MEDICINE

## 2018-12-24 RX ORDER — SIMVASTATIN 40 MG
40 TABLET ORAL NIGHTLY
Qty: 90 TABLET | Refills: 3 | Status: SHIPPED | OUTPATIENT
Start: 2018-12-24 | End: 2019-12-15 | Stop reason: SDUPTHER

## 2018-12-24 RX ORDER — VALSARTAN 160 MG/1
160 TABLET ORAL DAILY
Qty: 90 TABLET | Refills: 3 | Status: SHIPPED | OUTPATIENT
Start: 2018-12-24 | End: 2020-01-16 | Stop reason: SDUPTHER

## 2018-12-24 RX ORDER — INSULIN LISPRO 100 [IU]/ML
INJECTION, SOLUTION INTRAVENOUS; SUBCUTANEOUS
Qty: 90 ML | Refills: 3 | Status: SHIPPED | OUTPATIENT
Start: 2018-12-24 | End: 2020-02-24

## 2018-12-24 RX ORDER — FENOFIBRATE 145 MG/1
145 TABLET, COATED ORAL DAILY
Qty: 90 TABLET | Refills: 3 | Status: SHIPPED | OUTPATIENT
Start: 2018-12-24 | End: 2019-12-15 | Stop reason: SDUPTHER

## 2018-12-24 ASSESSMENT — ENCOUNTER SYMPTOMS
RESPIRATORY NEGATIVE: 1
COUGH: 0
GASTROINTESTINAL NEGATIVE: 1

## 2018-12-24 NOTE — PROGRESS NOTES
LABALBU 4.0 09/30/2017    BILITOT 1.0 09/30/2017    ALKPHOS 56 09/30/2017    AST 25 09/30/2017    ALT 26 09/30/2017    LABGLOM 50 11/08/2017     The rest of this patient's conditions are stable. Past medical and surgical hx reviewed. Past Medical History:   Diagnosis Date    OLIVA (acute kidney injury) ([facility] [address] surgery for scotal abscess 10/13/2017    Hyperlipidemia     Hypertension     Scrotal abscess 10/02/2017    Tobacco abuse     Type II or unspecified type diabetes mellitus without mention of complication, not stated as uncontrolled      Past Surgical History:   Procedure Laterality Date    ABSCESS DRAINAGE  10/02/2017    Scrotal    RECTAL SURGERY      fissure     Portions of this note were completed with a voice recording program.  Efforts were made to edit the dictations but occasionally words are mis-transcribed. Review of Systems   Constitutional: Negative. HENT: Positive for congestion. Respiratory: Negative. Negative for cough. Cardiovascular: Negative. Negative for chest pain, palpitations and leg swelling. Gastrointestinal: Negative. Endocrine: Negative. Genitourinary: Negative. Negative for difficulty urinating. Musculoskeletal: Positive for arthralgias. Skin: Negative. Allergic/Immunologic: Positive for environmental allergies. Neurological: Negative for dizziness, light-headedness and headaches. Hematological: Negative. Psychiatric/Behavioral: Negative. All other systems reviewed and are negative. Objective:   Physical Exam   Constitutional: He is oriented to person, place, and time. He appears well-developed and well-nourished. HENT:   Right Ear: External ear normal.   Left Ear: External ear normal.   Nose: Nose normal.   Mouth/Throat: Oropharynx is clear and moist. No oropharyngeal exudate. Eyes: Pupils are equal, round, and reactive to light. Conjunctivae are normal.   Neck: Neck supple. No thyromegaly present.    Cardiovascular: Normal rate, regular rhythm, normal heart sounds and intact distal pulses. No extrasystoles are present. Exam reveals no gallop. No murmur heard. Pulmonary/Chest: Effort normal and breath sounds normal. No respiratory distress. He has no wheezes. He has no rales. Abdominal: Soft. Bowel sounds are normal.   Musculoskeletal: He exhibits no edema or tenderness. Lymphadenopathy:     He has no cervical adenopathy. Neurological: He is alert and oriented to person, place, and time. Skin: Skin is warm and dry. No rash noted. Psychiatric: He has a normal mood and affect. Nursing note and vitals reviewed. Assessment:       Diagnosis Orders   1. Well adult exam     2. Mixed hyperlipidemia  fenofibrate (TRICOR) 145 MG tablet    simvastatin (ZOCOR) 40 MG tablet   3. Type 2 diabetes mellitus without complication, with long-term current use of insulin (HCC)  HUMALOG KWIKPEN 100 UNIT/ML pen    insulin glargine (LANTUS SOLOSTAR) 100 UNIT/ML injection pen    simvastatin (ZOCOR) 40 MG tablet    valsartan (DIOVAN) 160 MG tablet   4. Essential hypertension  valsartan (DIOVAN) 160 MG tablet   5. Hypercalcemia     6. Seasonal allergic rhinitis due to pollen     7. Medication monitoring encounter               Plan:      No orders of the defined types were placed in this encounter. Medications Discontinued During This Encounter   Medication Reason    fenofibrate (TRICOR) 145 MG tablet REORDER    HUMALOG KWIKPEN 100 UNIT/ML pen REORDER    insulin glargine (LANTUS SOLOSTAR) 100 UNIT/ML injection pen REORDER    simvastatin (ZOCOR) 40 MG tablet REORDER    valsartan (DIOVAN) 160 MG tablet REORDER     Current Outpatient Prescriptions   Medication Sig Dispense Refill    fenofibrate (TRICOR) 145 MG tablet Take 1 tablet by mouth daily 90 tablet 3    HUMALOG KWIKPEN 100 UNIT/ML pen Per sliding scale from diabetic clinic.  90 mL 3    insulin glargine (LANTUS SOLOSTAR) 100 UNIT/ML injection pen Inject 45 Units into the skin 2 times

## 2019-01-24 ENCOUNTER — TELEPHONE (OUTPATIENT)
Dept: FAMILY MEDICINE CLINIC | Age: 49
End: 2019-01-24

## 2019-03-27 ENCOUNTER — OFFICE VISIT (OUTPATIENT)
Dept: FAMILY MEDICINE CLINIC | Age: 49
End: 2019-03-27
Payer: COMMERCIAL

## 2019-03-27 VITALS
DIASTOLIC BLOOD PRESSURE: 80 MMHG | RESPIRATION RATE: 16 BRPM | BODY MASS INDEX: 36.98 KG/M2 | SYSTOLIC BLOOD PRESSURE: 136 MMHG | HEIGHT: 72 IN | WEIGHT: 273 LBS | HEART RATE: 84 BPM

## 2019-03-27 DIAGNOSIS — E11.9 TYPE 2 DIABETES MELLITUS WITHOUT COMPLICATION, WITH LONG-TERM CURRENT USE OF INSULIN (HCC): Primary | ICD-10-CM

## 2019-03-27 DIAGNOSIS — Z79.4 TYPE 2 DIABETES MELLITUS WITHOUT COMPLICATION, WITH LONG-TERM CURRENT USE OF INSULIN (HCC): Primary | ICD-10-CM

## 2019-03-27 DIAGNOSIS — E78.2 MIXED HYPERLIPIDEMIA: ICD-10-CM

## 2019-03-27 DIAGNOSIS — Z72.0 TOBACCO USE: ICD-10-CM

## 2019-03-27 DIAGNOSIS — J30.1 SEASONAL ALLERGIC RHINITIS DUE TO POLLEN: ICD-10-CM

## 2019-03-27 DIAGNOSIS — E66.01 CLASS 2 SEVERE OBESITY DUE TO EXCESS CALORIES WITH SERIOUS COMORBIDITY AND BODY MASS INDEX (BMI) OF 37.0 TO 37.9 IN ADULT (HCC): ICD-10-CM

## 2019-03-27 DIAGNOSIS — I10 ESSENTIAL HYPERTENSION: ICD-10-CM

## 2019-03-27 DIAGNOSIS — Z51.81 MEDICATION MONITORING ENCOUNTER: ICD-10-CM

## 2019-03-27 PROCEDURE — G8484 FLU IMMUNIZE NO ADMIN: HCPCS | Performed by: FAMILY MEDICINE

## 2019-03-27 PROCEDURE — 4004F PT TOBACCO SCREEN RCVD TLK: CPT | Performed by: FAMILY MEDICINE

## 2019-03-27 PROCEDURE — G8417 CALC BMI ABV UP PARAM F/U: HCPCS | Performed by: FAMILY MEDICINE

## 2019-03-27 PROCEDURE — 2022F DILAT RTA XM EVC RTNOPTHY: CPT | Performed by: FAMILY MEDICINE

## 2019-03-27 PROCEDURE — 99214 OFFICE O/P EST MOD 30 MIN: CPT | Performed by: FAMILY MEDICINE

## 2019-03-27 PROCEDURE — G8427 DOCREV CUR MEDS BY ELIG CLIN: HCPCS | Performed by: FAMILY MEDICINE

## 2019-03-27 PROCEDURE — 3046F HEMOGLOBIN A1C LEVEL >9.0%: CPT | Performed by: FAMILY MEDICINE

## 2019-03-27 ASSESSMENT — PATIENT HEALTH QUESTIONNAIRE - PHQ9
SUM OF ALL RESPONSES TO PHQ9 QUESTIONS 1 & 2: 0
SUM OF ALL RESPONSES TO PHQ QUESTIONS 1-9: 0
2. FEELING DOWN, DEPRESSED OR HOPELESS: 0
SUM OF ALL RESPONSES TO PHQ QUESTIONS 1-9: 0
1. LITTLE INTEREST OR PLEASURE IN DOING THINGS: 0

## 2019-03-27 ASSESSMENT — ENCOUNTER SYMPTOMS
GASTROINTESTINAL NEGATIVE: 1
RESPIRATORY NEGATIVE: 1

## 2019-07-09 ENCOUNTER — TELEPHONE (OUTPATIENT)
Dept: FAMILY MEDICINE CLINIC | Age: 49
End: 2019-07-09

## 2019-07-11 LAB
AVERAGE GLUCOSE: 232 MG/DL (ref 66–114)
HBA1C MFR BLD: 9.7 %

## 2019-07-17 ENCOUNTER — OFFICE VISIT (OUTPATIENT)
Dept: FAMILY MEDICINE CLINIC | Age: 49
End: 2019-07-17
Payer: COMMERCIAL

## 2019-07-17 VITALS
SYSTOLIC BLOOD PRESSURE: 114 MMHG | RESPIRATION RATE: 16 BRPM | BODY MASS INDEX: 35.97 KG/M2 | DIASTOLIC BLOOD PRESSURE: 72 MMHG | WEIGHT: 265.2 LBS | HEART RATE: 80 BPM

## 2019-07-17 DIAGNOSIS — E11.9 TYPE 2 DIABETES MELLITUS WITHOUT COMPLICATION, WITH LONG-TERM CURRENT USE OF INSULIN (HCC): Primary | ICD-10-CM

## 2019-07-17 DIAGNOSIS — Z51.81 MEDICATION MONITORING ENCOUNTER: ICD-10-CM

## 2019-07-17 DIAGNOSIS — E78.2 MIXED HYPERLIPIDEMIA: ICD-10-CM

## 2019-07-17 DIAGNOSIS — Z79.4 TYPE 2 DIABETES MELLITUS WITHOUT COMPLICATION, WITH LONG-TERM CURRENT USE OF INSULIN (HCC): Primary | ICD-10-CM

## 2019-07-17 DIAGNOSIS — I10 ESSENTIAL HYPERTENSION: ICD-10-CM

## 2019-07-17 DIAGNOSIS — G47.26 RECURRENT CIRCADIAN RHYTHM SLEEP DISORDER, SHIFT WORK TYPE: ICD-10-CM

## 2019-07-17 DIAGNOSIS — E66.01 CLASS 2 SEVERE OBESITY DUE TO EXCESS CALORIES WITH SERIOUS COMORBIDITY AND BODY MASS INDEX (BMI) OF 37.0 TO 37.9 IN ADULT (HCC): ICD-10-CM

## 2019-07-17 DIAGNOSIS — J30.1 SEASONAL ALLERGIC RHINITIS DUE TO POLLEN: ICD-10-CM

## 2019-07-17 PROCEDURE — G8417 CALC BMI ABV UP PARAM F/U: HCPCS | Performed by: FAMILY MEDICINE

## 2019-07-17 PROCEDURE — 3046F HEMOGLOBIN A1C LEVEL >9.0%: CPT | Performed by: FAMILY MEDICINE

## 2019-07-17 PROCEDURE — 2022F DILAT RTA XM EVC RTNOPTHY: CPT | Performed by: FAMILY MEDICINE

## 2019-07-17 PROCEDURE — 99214 OFFICE O/P EST MOD 30 MIN: CPT | Performed by: FAMILY MEDICINE

## 2019-07-17 PROCEDURE — G8427 DOCREV CUR MEDS BY ELIG CLIN: HCPCS | Performed by: FAMILY MEDICINE

## 2019-07-17 PROCEDURE — 4004F PT TOBACCO SCREEN RCVD TLK: CPT | Performed by: FAMILY MEDICINE

## 2019-07-17 ASSESSMENT — ENCOUNTER SYMPTOMS
RESPIRATORY NEGATIVE: 1
COUGH: 0
EYES NEGATIVE: 1
GASTROINTESTINAL NEGATIVE: 1
SHORTNESS OF BREATH: 0

## 2019-07-17 NOTE — PROGRESS NOTES
Subjective:      Patient ID: Jose Carlos Mccabe is a 50 y.o. male. HPI  Follow up of chronic conditions. Encounter Diagnoses   Name Primary?  Type 2 diabetes mellitus without complication, with long-term current use of insulin ([facility]) Yes    Essential hypertension     Mixed hyperlipidemia     Class 2 severe obesity due to excess calories with serious comorbidity and body mass index (BMI) of 37.0 to 37.9 in adult [facility])     Seasonal allergic rhinitis due to pollen     Medication monitoring encounter     Recurrent circadian rhythm sleep disorder, shift work type      DM2 continues to be unstable but he is also had an episode or 2 of hypoglycemia when he had missed breakfast.  What makes his ribs diabetes so difficult to manage his he is working at [employer] working 12-hour shifts from 6 AM to 6 PM for several days. Off 2 days and then rotates to 3 days of 6 PM to 6 AM.  This leads to blood sugars that vary all over the map and he is getting increasingly frustrated with a \"doing the right thing but blood sugar is not showing evidence of good control. At one point he nearly broke down because he was so frustrated trying to \"do the right thing\" but hemoglobin A1c is still showing poor control. His home blood sugars have shown increasing levels of control confirmed by his wife also. Lab Results   Component Value Date    LABA1C 9.7 (H) 07/11/2019    LABA1C 9.6 (H) 12/13/2018    LABA1C 8.9 (H) 05/23/2018     Lab Results   Component Value Date    LDLCALC 82 05/23/2018    CREATININE 1.5 (H) 11/08/2017     At this point, we will have him continue doing as good a job as he can with a goal of trying to keep his hemoglobin A1c below 9% as long as he is swing shifting. HTN is stable with current medications. Pt has no medication side effects nor orthostatic symptoms.    BP Readings from Last 3 Encounters:   07/17/19 114/72   03/27/19 136/80   12/24/18 136/74     Evidence of his dietary changes is an 8 pound weight Positive for environmental allergies. Neurological: Negative. Hematological: Negative. Psychiatric/Behavioral: Positive for sleep disturbance. All other systems reviewed and are negative. Objective:   Physical Exam   Constitutional: He is oriented to person, place, and time. He appears well-developed and well-nourished. HENT:   Right Ear: Tympanic membrane, external ear and ear canal normal.   Left Ear: Tympanic membrane, external ear and ear canal normal.   Nose: Mucosal edema and rhinorrhea present. Mouth/Throat: Oropharynx is clear and moist. No oropharyngeal exudate. Eyes: Pupils are equal, round, and reactive to light. Conjunctivae are normal.   Neck: No thyromegaly present. Cardiovascular: Normal rate, regular rhythm and normal heart sounds. Exam reveals no gallop. No murmur heard. Pulmonary/Chest: Effort normal and breath sounds normal.   Abdominal: Soft. Bowel sounds are normal.   Musculoskeletal: Normal range of motion. He exhibits no edema. Lymphadenopathy:     He has no cervical adenopathy. Neurological: He is alert and oriented to person, place, and time. Skin: Skin is warm and dry. Capillary refill takes less than 2 seconds. Psychiatric: He has a normal mood and affect. Nursing note and vitals reviewed. Assessment:       Diagnosis Orders   1. Type 2 diabetes mellitus without complication, with long-term current use of insulin ([facility])  HM DIABETES FOOT EXAM   2. Essential hypertension     3. Mixed hyperlipidemia     4. Class 2 severe obesity due to excess calories with serious comorbidity and body mass index (BMI) of 37.0 to 37.9 in adult ([address]     5. Seasonal allergic rhinitis due to pollen     6. Medication monitoring encounter     7. Recurrent circadian rhythm sleep disorder, shift work type             Plan:      No orders of the defined types were placed in this encounter. There are no discontinued medications.   Current Outpatient Medications   Medication

## 2019-07-17 NOTE — PATIENT INSTRUCTIONS
You may receive a survey regarding the care you received during your visit. Your input is valuable to us. We encourage you to complete and return your survey. We hope you will choose us in the future for your healthcare needs. Get labs today. Keep tracking home blood sugars.

## 2019-07-20 LAB
ABSOLUTE BASO #: 0.1 K/UL (ref 0–0.1)
ABSOLUTE EOS #: 0.2 K/UL (ref 0.1–0.4)
ABSOLUTE LYMPH #: 2.5 K/UL (ref 0.8–5.2)
ABSOLUTE MONO #: 0.7 K/UL (ref 0.1–0.9)
ABSOLUTE NEUT #: 3.9 K/UL (ref 1.3–9.1)
BASOPHILS RELATIVE PERCENT: 1.2 %
EOSINOPHILS RELATIVE PERCENT: 2.6 %
HCT VFR BLD CALC: 44.3 % (ref 41.4–51)
HEMOGLOBIN: 15 G/DL (ref 13.8–17)
LYMPHOCYTE %: 33.9 %
MCH RBC QN AUTO: 30.1 PG (ref 27–34)
MCHC RBC AUTO-ENTMCNC: 33.9 G/DL (ref 31–36)
MCV RBC AUTO: 88.8 FL (ref 80–100)
MONOCYTES # BLD: 8.8 %
NEUTROPHILS RELATIVE PERCENT: 53.2 %
PDW BLD-RTO: 12.5 % (ref 10.8–14.8)
PLATELETS: 293 K/UL (ref 150–450)
RBC: 4.99 M/UL (ref 4–5.5)
WBC: 7.4 K/UL (ref 3.7–10.8)

## 2019-07-21 LAB
ALBUMIN SERPL-MCNC: 4.2 G/DL (ref 3.2–5.3)
ALK PHOSPHATASE: 50 U/L (ref 39–130)
ALT SERPL-CCNC: 26 U/L (ref 0–40)
ANION GAP SERPL CALCULATED.3IONS-SCNC: 9 MMOL/L (ref 4–12)
AST SERPL-CCNC: 18 U/L (ref 0–41)
AVERAGE GLUCOSE: 226 MG/DL (ref 66–114)
BILIRUB SERPL-MCNC: 0.5 MG/DL (ref 0.3–1.2)
BUN BLDV-MCNC: 23 MG/DL (ref 5–23)
CALCIUM SERPL-MCNC: 9.9 MG/DL (ref 8.5–10.5)
CHLORIDE BLD-SCNC: 103 MMOL/L (ref 98–109)
CHOLESTEROL/HDL RATIO: 6 (ref 1–5)
CHOLESTEROL: 198 MG/DL (ref 150–200)
CO2: 26 MMOL/L (ref 22–32)
CREAT SERPL-MCNC: 1.28 MG/DL (ref 0.6–1.3)
EGFR AFRICAN AMERICAN: >60 ML/MIN/1.73SQ.M
EGFR IF NONAFRICAN AMERICAN: 60 ML/MIN/1.73SQ.M
GLUCOSE: 248 MG/DL (ref 65–99)
HBA1C MFR BLD: 9.5 %
HDLC SERPL-MCNC: 33 MG/DL
LDL CHOLESTEROL CALCULATED: 98 MG/DL
LDL/HDL RATIO: 3
POTASSIUM SERPL-SCNC: 5.1 MMOL/L (ref 3.5–5)
SODIUM BLD-SCNC: 138 MMOL/L (ref 134–146)
TOTAL PROTEIN: 6.7 G/DL (ref 6–8)
TRIGL SERPL-MCNC: 337 MG/DL (ref 27–150)
VLDLC SERPL CALC-MCNC: 67 MG/DL (ref 0–30)

## 2019-12-15 DIAGNOSIS — E78.2 MIXED HYPERLIPIDEMIA: ICD-10-CM

## 2019-12-15 DIAGNOSIS — E11.9 TYPE 2 DIABETES MELLITUS WITHOUT COMPLICATION, WITH LONG-TERM CURRENT USE OF INSULIN (HCC): ICD-10-CM

## 2019-12-15 DIAGNOSIS — Z79.4 TYPE 2 DIABETES MELLITUS WITHOUT COMPLICATION, WITH LONG-TERM CURRENT USE OF INSULIN (HCC): ICD-10-CM

## 2019-12-16 RX ORDER — FENOFIBRATE 145 MG/1
145 TABLET, COATED ORAL DAILY
Qty: 120 TABLET | Refills: 3 | Status: SHIPPED | OUTPATIENT
Start: 2019-12-16 | End: 2020-11-16

## 2019-12-16 RX ORDER — SIMVASTATIN 40 MG
40 TABLET ORAL NIGHTLY
Qty: 120 TABLET | Refills: 3 | Status: SHIPPED | OUTPATIENT
Start: 2019-12-16 | End: 2020-11-16

## 2020-01-16 ENCOUNTER — OFFICE VISIT (OUTPATIENT)
Dept: FAMILY MEDICINE CLINIC | Age: 50
End: 2020-01-16
Payer: COMMERCIAL

## 2020-01-16 VITALS
WEIGHT: 262 LBS | BODY MASS INDEX: 35.53 KG/M2 | DIASTOLIC BLOOD PRESSURE: 82 MMHG | RESPIRATION RATE: 20 BRPM | HEART RATE: 104 BPM | SYSTOLIC BLOOD PRESSURE: 126 MMHG

## 2020-01-16 LAB — HBA1C MFR BLD: 11.6 %

## 2020-01-16 PROCEDURE — G8484 FLU IMMUNIZE NO ADMIN: HCPCS | Performed by: FAMILY MEDICINE

## 2020-01-16 PROCEDURE — 99214 OFFICE O/P EST MOD 30 MIN: CPT | Performed by: FAMILY MEDICINE

## 2020-01-16 PROCEDURE — 83036 HEMOGLOBIN GLYCOSYLATED A1C: CPT | Performed by: FAMILY MEDICINE

## 2020-01-16 PROCEDURE — 4004F PT TOBACCO SCREEN RCVD TLK: CPT | Performed by: FAMILY MEDICINE

## 2020-01-16 PROCEDURE — 3046F HEMOGLOBIN A1C LEVEL >9.0%: CPT | Performed by: FAMILY MEDICINE

## 2020-01-16 PROCEDURE — 2022F DILAT RTA XM EVC RTNOPTHY: CPT | Performed by: FAMILY MEDICINE

## 2020-01-16 PROCEDURE — G8427 DOCREV CUR MEDS BY ELIG CLIN: HCPCS | Performed by: FAMILY MEDICINE

## 2020-01-16 PROCEDURE — G8417 CALC BMI ABV UP PARAM F/U: HCPCS | Performed by: FAMILY MEDICINE

## 2020-01-16 RX ORDER — ASPIRIN 81 MG/1
81 TABLET ORAL DAILY
COMMUNITY
End: 2021-01-07 | Stop reason: SDUPTHER

## 2020-01-16 RX ORDER — VALSARTAN 160 MG/1
160 TABLET ORAL DAILY
Qty: 90 TABLET | Refills: 3 | Status: SHIPPED | OUTPATIENT
Start: 2020-01-16 | End: 2021-01-07 | Stop reason: SDUPTHER

## 2020-01-16 SDOH — ECONOMIC STABILITY: INCOME INSECURITY: HOW HARD IS IT FOR YOU TO PAY FOR THE VERY BASICS LIKE FOOD, HOUSING, MEDICAL CARE, AND HEATING?: NOT HARD AT ALL

## 2020-01-16 SDOH — ECONOMIC STABILITY: TRANSPORTATION INSECURITY
IN THE PAST 12 MONTHS, HAS LACK OF TRANSPORTATION KEPT YOU FROM MEETINGS, WORK, OR FROM GETTING THINGS NEEDED FOR DAILY LIVING?: NO

## 2020-01-16 SDOH — ECONOMIC STABILITY: FOOD INSECURITY: WITHIN THE PAST 12 MONTHS, YOU WORRIED THAT YOUR FOOD WOULD RUN OUT BEFORE YOU GOT MONEY TO BUY MORE.: NEVER TRUE

## 2020-01-16 SDOH — ECONOMIC STABILITY: TRANSPORTATION INSECURITY
IN THE PAST 12 MONTHS, HAS THE LACK OF TRANSPORTATION KEPT YOU FROM MEDICAL APPOINTMENTS OR FROM GETTING MEDICATIONS?: NO

## 2020-01-16 SDOH — ECONOMIC STABILITY: FOOD INSECURITY: WITHIN THE PAST 12 MONTHS, THE FOOD YOU BOUGHT JUST DIDN'T LAST AND YOU DIDN'T HAVE MONEY TO GET MORE.: NEVER TRUE

## 2020-01-16 ASSESSMENT — ENCOUNTER SYMPTOMS
ALLERGIC/IMMUNOLOGIC NEGATIVE: 1
GASTROINTESTINAL NEGATIVE: 1
RESPIRATORY NEGATIVE: 1

## 2020-01-16 ASSESSMENT — PATIENT HEALTH QUESTIONNAIRE - PHQ9
SUM OF ALL RESPONSES TO PHQ QUESTIONS 1-9: 0
SUM OF ALL RESPONSES TO PHQ QUESTIONS 1-9: 0
2. FEELING DOWN, DEPRESSED OR HOPELESS: 0
SUM OF ALL RESPONSES TO PHQ9 QUESTIONS 1 & 2: 0
1. LITTLE INTEREST OR PLEASURE IN DOING THINGS: 0

## 2020-01-16 NOTE — PROGRESS NOTES
Subjective:      Patient ID: Lindsey Escamilla is a 52 y.o. male. HPI  Encounter Diagnoses   Name Primary?  Type 2 diabetes mellitus without complication, with long-term current use of insulin (HCC) Yes    Essential hypertension     Class 2 severe obesity due to excess calories with serious comorbidity and body mass index (BMI) of 37.0 to 37.9 in adult [facility])     Mixed hyperlipidemia     Medication monitoring encounter     Stuart Ibanez continues to work on trying to drop his weight and attempt to bring his blood sugars under better control. Current BMI is consistent with stage II obesity. Wt Readings from Last 3 Encounters:   01/16/20 262 lb (118.8 kg)   07/17/19 265 lb 3.2 oz (120.3 kg)   03/27/19 273 lb (123.8 kg)   Body mass index is 35.53 kg/m². DM2 is not controlled at all currently. He continues to use the same dose of both Humalog and Lantus and has had a total of 90 mg daily Lantus and about another 45 mg of Humalog scattered throughout the day. He used to be well controlled when on metformin but was stopped by nephrology for some reason in the past.  He is not on the sodium transport meds so I will start him on Jardiance and see if this does not aid in lowering his hemoglobin A1c. Lab Results   Component Value Date    LABA1C 11.6 01/16/2020    LABA1C 9.5 (H) 07/20/2019    LABA1C 9.7 (H) 07/11/2019     Lab Results   Component Value Date    LDLCALC 98 07/20/2019    CREATININE 1.28 07/20/2019     His most recent glomerular filtration rate is greater than 60 which is good. Therefore there is no contraindication to using Jardiance.   Lab Results   Component Value Date     07/20/2019    K 5.1 (H) 07/20/2019     07/20/2019    CO2 26 07/20/2019    BUN 23 07/20/2019    CREATININE 1.28 07/20/2019    GLUCOSE 248 (H) 07/20/2019    CALCIUM 9.9 07/20/2019    PROT 6.7 07/20/2019    LABALBU 4.2 07/20/2019    BILITOT 0.5 07/20/2019    ALKPHOS 50 07/20/2019    AST 18 07/20/2019    ALT 26 07/20/2019    LABGLOM 50 11/08/2017     The other issue he deals with as he works out at [employer] so he is flopping between a day 12-hour shift and a night 12-hour shift every other week. This definitely makes any semblance of a normal eating schedule very difficult. He admits to having some difficulty with depressed mood after it was brought up by his wife. He consents to go ahead and use sertraline which will be started at 50 mg daily for his dysthymia. The rest of this patient's conditions are stable. Past medical and surgical hx reviewed. Past Medical History:   Diagnosis Date    OLIVA (acute kidney injury) ([facility] [address] surgery for scotal abscess 10/13/2017    Hyperlipidemia     Hypertension     Scrotal abscess 10/02/2017    Tobacco abuse     Type II or unspecified type diabetes mellitus without mention of complication, not stated as uncontrolled      Past Surgical History:   Procedure Laterality Date    ABSCESS DRAINAGE  10/02/2017    Scrotal    RECTAL SURGERY      fissure     Portions of this note were completed with a voice recording program.  Efforts were made to edit the dictations but occasionally words are mis-transcribed. Review of Systems   Constitutional: Negative. HENT: Negative. Respiratory: Negative. Cardiovascular: Negative. Negative for chest pain, palpitations and leg swelling. Gastrointestinal: Negative. Endocrine: Negative for polydipsia, polyphagia and polyuria. Genitourinary: Negative. Musculoskeletal: Negative. Allergic/Immunologic: Negative. Neurological: Negative. Hematological: Negative. Psychiatric/Behavioral: Positive for dysphoric mood. All other systems reviewed and are negative. Objective:   Physical Exam  Vitals signs and nursing note reviewed. Constitutional:       Appearance: He is obese.    HENT:      Right Ear: Tympanic membrane, ear canal and external ear normal.      Left Ear: Tympanic membrane, ear canal and external ear normal.      Nose: Nose normal.      Mouth/Throat:      Mouth: Mucous membranes are moist.      Pharynx: Oropharynx is clear. Eyes:      Conjunctiva/sclera: Conjunctivae normal.      Pupils: Pupils are equal, round, and reactive to light. Neck:      Vascular: No carotid bruit. Cardiovascular:      Rate and Rhythm: Normal rate and regular rhythm. Pulses: Normal pulses. Heart sounds: Normal heart sounds. No murmur. No gallop. Pulmonary:      Effort: Pulmonary effort is normal.      Breath sounds: Normal breath sounds. Abdominal:      General: Bowel sounds are normal.   Musculoskeletal:      Right lower leg: No edema. Left lower leg: No edema. Skin:     General: Skin is warm and dry. Capillary Refill: Capillary refill takes less than 2 seconds. Neurological:      General: No focal deficit present. Mental Status: He is alert and oriented to person, place, and time. Psychiatric:         Attention and Perception: Attention normal.         Mood and Affect: Mood is depressed. Speech: Speech normal.         Assessment:       Diagnosis Orders   1. Type 2 diabetes mellitus without complication, with long-term current use of insulin ([facility])  valsartan (DIOVAN) 160 MG tablet    POCT glycosylated hemoglobin (Hb A1C)    Hemoglobin A1C    empagliflozin (JARDIANCE) 25 MG tablet   2. Essential hypertension  valsartan (DIOVAN) 160 MG tablet   3. Class 2 severe obesity due to excess calories with serious comorbidity and body mass index (BMI) of 37.0 to 37.9 [facility]     4. Mixed hyperlipidemia     5. Medication monitoring encounter     6. Dysthymia             Plan:      Orders Placed This Encounter   Procedures    Hemoglobin A1C     STANDING ORDER: A1c every 3 months for 1 yr. Dx: E11.9  DM2 without complications.      Standing Status:   Future     Standing Expiration Date:   1/16/2021    POCT glycosylated hemoglobin (Hb A1C)     Medications Discontinued During This Encounter   Medication Reason    valsartan (DIOVAN) 160 MG tablet REORDER     Current Outpatient Medications   Medication Sig Dispense Refill    valsartan (DIOVAN) 160 MG tablet Take 1 tablet by mouth daily 90 tablet 3    aspirin 81 MG EC tablet Take 81 mg by mouth daily      sertraline (ZOLOFT) 50 MG tablet Take 1 tablet by mouth daily 30 tablet 5    empagliflozin (JARDIANCE) 25 MG tablet Take 25 mg by mouth daily 30 tablet 3    simvastatin (ZOCOR) 40 MG tablet TAKE 1 TABLET BY MOUTH  NIGHTLY 120 tablet 3    fenofibrate (TRICOR) 145 MG tablet TAKE 1 TABLET BY MOUTH  DAILY 120 tablet 3    HUMALOG KWIKPEN 100 UNIT/ML pen Per sliding scale from diabetic clinic. 90 mL 3    insulin glargine (LANTUS SOLOSTAR) 100 UNIT/ML injection pen Inject 45 Units into the skin 2 times daily 5 pen 5    Multiple Vitamins-Minerals (MULTIVITAMIN ADULTS PO) Take by mouth      acetaminophen (TYLENOL) 500 MG tablet Take 1,000 mg by mouth every 6 hours as needed for Pain      Insulin Pen Needle (B-D ULTRAFINE III SHORT PEN) 31G X 8 MM MISC Use as directed for insulin 200 each 3     No current facility-administered medications for this visit. Start sertraline 50 mg tablets daily. Initially take a half a tablet daily for 6 days then a full tablet. Jardiance 25 mg for additional blood sugar control. Continue current dose of insulin until blood sugar start to lower. Recheck appointment in 6 weeks for sertraline follow-up and recheck A1c. Marylen Bayley received counseling on the following healthy behaviors: nutrition and exercise  Reviewed prior labs and health maintenance  Continue current medications, diet and exercise. Discussed use, benefit, and side effects of prescribed medications. Barriers to medication compliance addressed. Patient given educational materials - see patient instructions  Was a self-tracking handout given in paper form or via [employer]?  Yes    Requested Prescriptions     Signed Prescriptions Disp Refills  valsartan (DIOVAN) 160 MG tablet 90 tablet 3     Sig: Take 1 tablet by mouth daily    sertraline (ZOLOFT) 50 MG tablet 30 tablet 5     Sig: Take 1 tablet by mouth daily    empagliflozin (JARDIANCE) 25 MG tablet 30 tablet 3     Sig: Take 25 mg by mouth daily       All patient questions answered. Patient voiced understanding. Quality Measures    Body mass index is 35.53 kg/m². Elevated. Weight control planned discussed Healthy diet and regular exercise. BP: 126/82 Blood pressure is normal. Treatment plan consists of No treatment change needed.     Lab Results   Component Value Date    LDLCALC 98 07/20/2019    (goal LDL reduction with dx if diabetes is 50% LDL reduction)      PHQ Scores 1/16/2020 3/27/2019 5/24/2018 4/4/2017   PHQ2 Score 0 0 0 0   PHQ9 Score 0 0 0 0     Interpretation of Total Score Depression Severity: 1-4 = Minimal depression, 5-9 = Mild depression, 10-14 = Moderate depression, 15-19 = Moderately severe depression, 20-27 = Severe depression            Zan Diaz MD

## 2020-01-16 NOTE — PATIENT INSTRUCTIONS
appointments between the patient and a respiratory therapist.  The four appointments span over three weeks. The respiratory therapist schedules one of the appointments to occur 48 hours after the patients quit date.  Cost $100 total for the four sessions. Tobacco cessation products are not included in the cost and are not provided by [facility]     Patient Education        Learning About High Blood Pressure  What is high blood pressure? Blood pressure is a measure of how hard the blood pushes against the walls of your arteries. It's normal for blood pressure to go up and down throughout the day, but if it stays up, you have high blood pressure. Another name for high blood pressure is hypertension. Two numbers tell you your blood pressure. The first number is the systolic pressure. It shows how hard the blood pushes when your heart is pumping. The second number is the diastolic pressure. It shows how hard the blood pushes between heartbeats, when your heart is relaxed and filling with blood. Your doctor will give you a goal for your blood pressure. Your goal will be based on your health and your age. High blood pressure (hypertension) means that the top number stays high, or the bottom number stays high, or both. High blood pressure increases the risk of stroke, heart attack, and other problems. You and your doctor will talk about your risks of these problems based on your blood pressure. What happens when you have high blood pressure? · Blood flows through your arteries with too much force. Over time, this damages the walls of your arteries. But you can't feel it. High blood pressure usually doesn't cause symptoms. · Fat and calcium start to build up in your arteries. This buildup is called plaque. Plaque makes your arteries narrower and stiffer. Blood can't flow through them as easily. · This lack of good blood flow starts to damage some of the organs in your body.  This can lead to problems such as coronary artery disease and heart attack, heart failure, stroke, kidney failure, and eye damage. How can you prevent high blood pressure? · Stay at a healthy weight. · Try to limit how much sodium you eat to less than 2,300 milligrams (mg) a day. If you limit your sodium to 1,500 mg a day, you can lower your blood pressure even more. ? Buy foods that are labeled \"unsalted,\" \"sodium-free,\" or \"low-sodium. \" Foods labeled \"reduced-sodium\" and \"light sodium\" may still have too much sodium. ? Flavor your food with garlic, lemon juice, onion, vinegar, herbs, and spices instead of salt. Do not use soy sauce, steak sauce, onion salt, garlic salt, mustard, or ketchup on your food. ? Use less salt (or none) when recipes call for it. You can often use half the salt a recipe calls for without losing flavor. · Be physically active. Get at least 30 minutes of exercise on most days of the week. Walking is a good choice. You also may want to do other activities, such as running, swimming, cycling, or playing tennis or team sports. · Limit alcohol to 2 drinks a day for men and 1 drink a day for women. · Eat plenty of fruits, vegetables, and low-fat dairy products. Eat less saturated and total fats. How is high blood pressure treated? · Your doctor will suggest making lifestyle changes to help your heart. For example, your doctor may ask you to eat healthy foods, quit smoking, lose extra weight, and be more active. · If lifestyle changes don't help enough, your doctor may recommend that you take medicine. · When blood pressure is very high, medicines are needed to lower it. Follow-up care is a key part of your treatment and safety. Be sure to make and go to all appointments, and call your doctor if you are having problems. It's also a good idea to know your test results and keep a list of the medicines you take. Where can you learn more? Go to [email or web address] and sign in to

## 2020-01-16 NOTE — PROGRESS NOTES
Chronic Disease Visit Information    BP Readings from Last 3 Encounters:   01/16/20 126/82   07/17/19 114/72   03/27/19 136/80          Hemoglobin A1C (%)   Date Value   07/20/2019 9.5 (H)   07/11/2019 9.7 (H)   12/13/2018 9.6 (H)     LDL Calculated (mg/dL)   Date Value   07/20/2019 98     HDL   Date Value   07/20/2019 33 mg/dL (L)   02/27/2012 39 mg/dl     BUN (mg/dL)   Date Value   07/20/2019 23     CREATININE (mg/dL)   Date Value   07/20/2019 1.28     Glucose (mg/dL)   Date Value   07/20/2019 248 (H)            Have you changed or started any medications since your last visit including any over-the-counter medicines, vitamins, or herbal medicines? no   Are you having any side effects from any of your medications? -  no  Have you stopped taking any of your medications? Is so, why? -  no    Have you seen any other physician or provider since your last visit? No  Have you had any other diagnostic tests since your last visit? No  Have you been seen in the emergency room and/or had an admission to a hospital since we last saw you? No  Have you had your annual diabetic retinal (eye) exam? Yes - Records Requested  Have you had your routine dental cleaning in the past 6 months? n/a    Have you activated your [employer] account? If not, what are your barriers?  Yes     Patient Care Team:  Bethany Del Toro MD as PCP - General (Family Medicine)  Bethany Del Toro MD as PCP - [facility]  Jeannette Hull MD as Consulting Physician (Nephrology)         Medical History Review  Past Medical, Family, and Social History reviewed and does contribute to the patient presenting condition    Health Maintenance   Topic Date Due    Pneumococcal 0-64 years Vaccine (1 of 1 - PPSV23) 11/08/1976    DTaP/Tdap/Td vaccine (1 - Tdap) 11/08/1981    Hepatitis B vaccine (1 of 3 - Risk 3-dose series) 11/08/1989    Diabetic microalbuminuria test  04/06/2018    Flu vaccine (1) 09/01/2019    A1C test (Diabetic or Prediabetic)  10/20/2019   

## 2020-02-24 RX ORDER — INSULIN LISPRO 100 [IU]/ML
INJECTION, SOLUTION INTRAVENOUS; SUBCUTANEOUS
Qty: 120 ML | Refills: 11 | Status: SHIPPED | OUTPATIENT
Start: 2020-02-24 | End: 2021-07-07 | Stop reason: SDUPTHER

## 2020-02-26 ENCOUNTER — OFFICE VISIT (OUTPATIENT)
Dept: FAMILY MEDICINE CLINIC | Age: 50
End: 2020-02-26
Payer: COMMERCIAL

## 2020-02-26 VITALS
DIASTOLIC BLOOD PRESSURE: 78 MMHG | HEART RATE: 73 BPM | OXYGEN SATURATION: 97 % | SYSTOLIC BLOOD PRESSURE: 120 MMHG | WEIGHT: 259 LBS | RESPIRATION RATE: 14 BRPM | BODY MASS INDEX: 35.13 KG/M2

## 2020-02-26 LAB — HBA1C MFR BLD: 9.1 %

## 2020-02-26 PROCEDURE — 4004F PT TOBACCO SCREEN RCVD TLK: CPT | Performed by: FAMILY MEDICINE

## 2020-02-26 PROCEDURE — 3046F HEMOGLOBIN A1C LEVEL >9.0%: CPT | Performed by: FAMILY MEDICINE

## 2020-02-26 PROCEDURE — G8417 CALC BMI ABV UP PARAM F/U: HCPCS | Performed by: FAMILY MEDICINE

## 2020-02-26 PROCEDURE — 2022F DILAT RTA XM EVC RTNOPTHY: CPT | Performed by: FAMILY MEDICINE

## 2020-02-26 PROCEDURE — G8484 FLU IMMUNIZE NO ADMIN: HCPCS | Performed by: FAMILY MEDICINE

## 2020-02-26 PROCEDURE — 83036 HEMOGLOBIN GLYCOSYLATED A1C: CPT | Performed by: FAMILY MEDICINE

## 2020-02-26 PROCEDURE — G8427 DOCREV CUR MEDS BY ELIG CLIN: HCPCS | Performed by: FAMILY MEDICINE

## 2020-02-26 PROCEDURE — 99214 OFFICE O/P EST MOD 30 MIN: CPT | Performed by: FAMILY MEDICINE

## 2020-02-26 ASSESSMENT — ENCOUNTER SYMPTOMS
RESPIRATORY NEGATIVE: 1
GASTROINTESTINAL NEGATIVE: 1
ALLERGIC/IMMUNOLOGIC NEGATIVE: 1

## 2020-02-26 NOTE — PROGRESS NOTES
edit the dictations but occasionally words are mis-transcribed. Review of Systems   Constitutional: Negative. HENT: Negative. Respiratory: Negative. Cardiovascular: Negative. Gastrointestinal: Negative. Endocrine: Negative. Negative for polydipsia, polyphagia and polyuria. Genitourinary: Negative. Musculoskeletal: Negative. Allergic/Immunologic: Negative. Neurological: Negative for dizziness, light-headedness and headaches. Hematological: Negative. Psychiatric/Behavioral: Negative. All other systems reviewed and are negative. Objective:   Physical Exam  Vitals signs and nursing note reviewed. Constitutional:       Appearance: He is obese. HENT:      Right Ear: Tympanic membrane, ear canal and external ear normal.      Left Ear: Tympanic membrane, ear canal and external ear normal.   Neck:      Vascular: No carotid bruit. Cardiovascular:      Rate and Rhythm: Normal rate and regular rhythm. No extrasystoles are present. Pulses: Normal pulses. Heart sounds: S1 normal and S2 normal. No murmur. No gallop. Pulmonary:      Effort: Pulmonary effort is normal.      Breath sounds: Normal breath sounds. No decreased breath sounds, wheezing, rhonchi or rales. Abdominal:      General: Bowel sounds are normal.   Musculoskeletal:      Right lower leg: No edema. Left lower leg: No edema. Lymphadenopathy:      Cervical: No cervical adenopathy. Skin:     General: Skin is warm and dry. Neurological:      Mental Status: He is alert and oriented to person, place, and time. Psychiatric:         Mood and Affect: Mood normal.         Assessment:       Diagnosis Orders   1. Type 2 diabetes mellitus without complication, with long-term current use of insulin (HCC)  POCT glycosylated hemoglobin (Hb A1C)   2. Essential hypertension     3. Mixed hyperlipidemia     4.  Class 2 severe obesity due to excess calories with serious comorbidity and body mass index (BMI) of

## 2020-02-26 NOTE — PATIENT INSTRUCTIONS
appointments between the patient and a respiratory therapist.  The four appointments span over three weeks. The respiratory therapist schedules one of the appointments to occur 48 hours after the patients quit date.  Cost $100 total for the four sessions. Tobacco cessation products are not included in the cost and are not provided by [facility].       Continue present medications. Continue dropping weight.

## 2020-05-11 NOTE — TELEPHONE ENCOUNTER
If the pharmacy will look at the prescription, it is written for generic glargine which) is listed as being filled either with Lantus or Basaglar.

## 2020-05-29 ENCOUNTER — OFFICE VISIT (OUTPATIENT)
Dept: FAMILY MEDICINE CLINIC | Age: 50
End: 2020-05-29
Payer: COMMERCIAL

## 2020-05-29 VITALS
HEIGHT: 72 IN | HEART RATE: 68 BPM | WEIGHT: 259 LBS | DIASTOLIC BLOOD PRESSURE: 72 MMHG | SYSTOLIC BLOOD PRESSURE: 128 MMHG | TEMPERATURE: 97.1 F | RESPIRATION RATE: 12 BRPM | BODY MASS INDEX: 35.08 KG/M2

## 2020-05-29 LAB — HBA1C MFR BLD: 10.6 %

## 2020-05-29 PROCEDURE — 2022F DILAT RTA XM EVC RTNOPTHY: CPT | Performed by: FAMILY MEDICINE

## 2020-05-29 PROCEDURE — 3046F HEMOGLOBIN A1C LEVEL >9.0%: CPT | Performed by: FAMILY MEDICINE

## 2020-05-29 PROCEDURE — 4004F PT TOBACCO SCREEN RCVD TLK: CPT | Performed by: FAMILY MEDICINE

## 2020-05-29 PROCEDURE — G8427 DOCREV CUR MEDS BY ELIG CLIN: HCPCS | Performed by: FAMILY MEDICINE

## 2020-05-29 PROCEDURE — 99214 OFFICE O/P EST MOD 30 MIN: CPT | Performed by: FAMILY MEDICINE

## 2020-05-29 PROCEDURE — 83036 HEMOGLOBIN GLYCOSYLATED A1C: CPT | Performed by: FAMILY MEDICINE

## 2020-05-29 PROCEDURE — G8417 CALC BMI ABV UP PARAM F/U: HCPCS | Performed by: FAMILY MEDICINE

## 2020-05-29 RX ORDER — INSULIN LISPRO 100 [IU]/ML
INJECTION, SOLUTION INTRAVENOUS; SUBCUTANEOUS
Qty: 30 PEN | Refills: 3 | Status: SHIPPED | OUTPATIENT
Start: 2020-05-29 | End: 2021-11-18

## 2020-05-29 ASSESSMENT — ENCOUNTER SYMPTOMS
ALLERGIC/IMMUNOLOGIC NEGATIVE: 1
GASTROINTESTINAL NEGATIVE: 1
RESPIRATORY NEGATIVE: 1

## 2020-05-29 NOTE — PROGRESS NOTES
Body mass index is consistent with stage II obesity at 35. 13. Wt Readings from Last 3 Encounters:   05/29/20 259 lb (117.5 kg)   02/26/20 259 lb (117.5 kg)   01/16/20 262 lb (118.8 kg)   Body mass index is 35.13 kg/m². The rest of this patient's conditions are stable. Past medical and surgical hx reviewed. Past Medical History:   Diagnosis Date    OLIVA (acute kidney injury) ([facility] [address] surgery for scotal abscess 10/13/2017    Hyperlipidemia     Hypertension     Scrotal abscess 10/02/2017    Tobacco abuse     Type II or unspecified type diabetes mellitus without mention of complication, not stated as uncontrolled      Past Surgical History:   Procedure Laterality Date    ABSCESS DRAINAGE  10/02/2017    Scrotal    RECTAL SURGERY      fissure     Portions of this note were completed with a voice recording program.  Efforts were made to edit the dictations but occasionally words are mis-transcribed. Review of Systems   Constitutional: Negative. HENT: Negative. Respiratory: Negative. Cardiovascular: Negative. Gastrointestinal: Negative. Endocrine: Negative. Negative for polydipsia, polyphagia and polyuria. Musculoskeletal: Positive for arthralgias. Allergic/Immunologic: Negative. Neurological: Negative. Hematological: Negative. Psychiatric/Behavioral: Negative. All other systems reviewed and are negative. Objective:   Physical Exam  Vitals signs and nursing note reviewed. Constitutional:       Appearance: He is obese. HENT:      Right Ear: Tympanic membrane, ear canal and external ear normal.      Left Ear: Tympanic membrane and ear canal normal.      Mouth/Throat:      Mouth: Mucous membranes are moist.      Pharynx: Oropharynx is clear. Eyes:      Conjunctiva/sclera: Conjunctivae normal.   Neck:      Musculoskeletal: No muscular tenderness. Vascular: No carotid bruit. Cardiovascular:      Rate and Rhythm: Normal rate and regular rhythm.       Pulses: Normal pulses. Heart sounds: Normal heart sounds. Pulmonary:      Effort: Pulmonary effort is normal.      Breath sounds: Normal breath sounds. No wheezing, rhonchi or rales. Abdominal:      General: Bowel sounds are normal.   Musculoskeletal:      Right lower leg: No edema. Left lower leg: No edema. Skin:     General: Skin is warm and dry. Capillary Refill: Capillary refill takes less than 2 seconds. Neurological:      General: No focal deficit present. Mental Status: He is alert and oriented to person, place, and time. Psychiatric:         Mood and Affect: Mood normal.         Assessment:       Diagnosis Orders   1. Type 2 diabetes mellitus without complication, with long-term current use of insulin ([facility])  POCT glycosylated hemoglobin (Hb A1C)    insulin lispro, 1 Unit Dial, (HUMALOG KWIKPEN) 100 UNIT/ML SOPN    Lipid, Fasting    Comprehensive Metabolic Panel, Fasting    CBC With Auto Differential    Microalbumin / Creatinine Urine Ratio   2. Essential hypertension  Lipid, Fasting    Comprehensive Metabolic Panel, Fasting   3. Mixed hyperlipidemia  Lipid, Fasting    Comprehensive Metabolic Panel, Fasting   4. Seasonal allergic rhinitis due to pollen     5. Recurrent circadian rhythm sleep disorder, shift work type     6.  Medication monitoring encounter  Lipid, Fasting    Comprehensive Metabolic Panel, Fasting    CBC With Auto Differential    Microalbumin / Creatinine Urine Ratio           Plan:      Orders Placed This Encounter   Procedures    Lipid, Fasting     Standing Status:   Future     Standing Expiration Date:   5/29/2021    Comprehensive Metabolic Panel, Fasting     Standing Status:   Future     Standing Expiration Date:   5/29/2021    CBC With Auto Differential     Standing Status:   Future     Standing Expiration Date:   5/29/2021    Microalbumin / Creatinine Urine Ratio     Standing Status:   Future     Standing Expiration Date:   5/29/2021    POCT glycosylated hemoglobin (Hb A1C)     There are no discontinued medications. Current Outpatient Medications   Medication Sig Dispense Refill    insulin lispro, 1 Unit Dial, (HUMALOG KWIKPEN) 100 UNIT/ML SOPN Dose per sliding scale. Max dose 100 units daily. 30 pen 3    JARDIANCE 25 MG tablet Take 1 tablet by mouth once daily 30 tablet 5    insulin glargine (LANTUS;BASAGLAR) 100 UNIT/ML injection pen Inject 45 Units into the skin 2 times daily 5 pen 3    HUMALOG KWIKPEN 100 UNIT/ML SOPN USE PER SLIDING SCALE,  MAXIMUM  UNITS PER   mL 11    valsartan (DIOVAN) 160 MG tablet Take 1 tablet by mouth daily 90 tablet 3    aspirin 81 MG EC tablet Take 81 mg by mouth daily      sertraline (ZOLOFT) 50 MG tablet Take 1 tablet by mouth daily 30 tablet 5    simvastatin (ZOCOR) 40 MG tablet TAKE 1 TABLET BY MOUTH  NIGHTLY 120 tablet 3    fenofibrate (TRICOR) 145 MG tablet TAKE 1 TABLET BY MOUTH  DAILY 120 tablet 3    Multiple Vitamins-Minerals (MULTIVITAMIN ADULTS PO) Take by mouth      acetaminophen (TYLENOL) 500 MG tablet Take 1,000 mg by mouth every 6 hours as needed for Pain      Insulin Pen Needle (B-D ULTRAFINE III SHORT PEN) 31G X 8 MM MISC Use as directed for insulin 200 each 3     No current facility-administered medications for this visit. Continue with current BS testing and dosing schedule. Try to cut out all pop. Get labs the middle of July, 2020. Discussed use, benefit, and side effects of prescribed medications. Barriers to compliance discussed. All patient questions answered. Pt voiced understanding.             Andre Boogie MD

## 2020-07-28 RX ORDER — INSULIN GLARGINE 100 [IU]/ML
INJECTION, SOLUTION SUBCUTANEOUS
Qty: 60 ML | Refills: 5 | Status: SHIPPED | OUTPATIENT
Start: 2020-07-28 | End: 2020-07-29 | Stop reason: CLARIF

## 2020-07-29 ENCOUNTER — TELEPHONE (OUTPATIENT)
Dept: FAMILY MEDICINE CLINIC | Age: 50
End: 2020-07-29

## 2020-07-29 RX ORDER — EMPAGLIFLOZIN 25 MG/1
TABLET, FILM COATED ORAL
Qty: 90 TABLET | Refills: 3 | Status: SHIPPED | OUTPATIENT
Start: 2020-07-29 | End: 2020-08-31 | Stop reason: SDUPTHER

## 2020-07-29 RX ORDER — INSULIN GLARGINE 100 [IU]/ML
45 INJECTION, SOLUTION SUBCUTANEOUS 2 TIMES DAILY
Qty: 45 PEN | Refills: 1 | Status: SHIPPED | OUTPATIENT
Start: 2020-07-29 | End: 2020-08-31 | Stop reason: SDUPTHER

## 2020-07-29 NOTE — TELEPHONE ENCOUNTER
Fax received from [email or web address] requesting PA for Basaglar or covered alternatives are Lantus or Toujeo.   Please advise

## 2020-08-26 ENCOUNTER — TELEPHONE (OUTPATIENT)
Dept: FAMILY MEDICINE CLINIC | Age: 50
End: 2020-08-26

## 2020-08-26 NOTE — TELEPHONE ENCOUNTER
L/M for pt to call office. Called patient for pre visit planning. Pt has appt on 8/31/2020 at 8:45am.  Please remind pt of appt date and time and check to see if they had their lab work done.

## 2020-08-26 NOTE — TELEPHONE ENCOUNTER
Marilee with [facility] called office stating they are going to fax over an order for Diabetic Supplies.

## 2020-08-31 ENCOUNTER — OFFICE VISIT (OUTPATIENT)
Dept: FAMILY MEDICINE CLINIC | Age: 50
End: 2020-08-31
Payer: COMMERCIAL

## 2020-08-31 VITALS
DIASTOLIC BLOOD PRESSURE: 60 MMHG | HEART RATE: 76 BPM | SYSTOLIC BLOOD PRESSURE: 114 MMHG | TEMPERATURE: 96.8 F | BODY MASS INDEX: 35.17 KG/M2 | WEIGHT: 259.3 LBS | RESPIRATION RATE: 20 BRPM

## 2020-08-31 LAB — HBA1C MFR BLD: 10.1 %

## 2020-08-31 PROCEDURE — 2022F DILAT RTA XM EVC RTNOPTHY: CPT | Performed by: FAMILY MEDICINE

## 2020-08-31 PROCEDURE — 83036 HEMOGLOBIN GLYCOSYLATED A1C: CPT | Performed by: FAMILY MEDICINE

## 2020-08-31 PROCEDURE — 99214 OFFICE O/P EST MOD 30 MIN: CPT | Performed by: FAMILY MEDICINE

## 2020-08-31 PROCEDURE — 3046F HEMOGLOBIN A1C LEVEL >9.0%: CPT | Performed by: FAMILY MEDICINE

## 2020-08-31 PROCEDURE — 4004F PT TOBACCO SCREEN RCVD TLK: CPT | Performed by: FAMILY MEDICINE

## 2020-08-31 PROCEDURE — G8427 DOCREV CUR MEDS BY ELIG CLIN: HCPCS | Performed by: FAMILY MEDICINE

## 2020-08-31 PROCEDURE — G8417 CALC BMI ABV UP PARAM F/U: HCPCS | Performed by: FAMILY MEDICINE

## 2020-08-31 RX ORDER — INSULIN GLARGINE 100 [IU]/ML
45 INJECTION, SOLUTION SUBCUTANEOUS 2 TIMES DAILY
Qty: 45 PEN | Refills: 1 | Status: SHIPPED | OUTPATIENT
Start: 2020-08-31 | End: 2021-10-08 | Stop reason: SDUPTHER

## 2020-08-31 RX ORDER — EMPAGLIFLOZIN 25 MG/1
TABLET, FILM COATED ORAL
Qty: 90 TABLET | Refills: 3 | Status: SHIPPED | OUTPATIENT
Start: 2020-08-31 | End: 2021-08-16 | Stop reason: SDUPTHER

## 2020-08-31 ASSESSMENT — ENCOUNTER SYMPTOMS
RESPIRATORY NEGATIVE: 1
GASTROINTESTINAL NEGATIVE: 1

## 2020-08-31 NOTE — PROGRESS NOTES
hx reviewed. Past Medical History:   Diagnosis Date    OLIVA (acute kidney injury) ([facility] [address] surgery for scotal abscess 10/13/2017    Hyperlipidemia     Hypertension     Scrotal abscess 10/02/2017    Tobacco abuse     Type II or unspecified type diabetes mellitus without mention of complication, not stated as uncontrolled      Past Surgical History:   Procedure Laterality Date    ABSCESS DRAINAGE  10/02/2017    Scrotal    RECTAL SURGERY      fissure     Portions of this note were completed with a voice recording program.  Efforts were made to edit the dictations but occasionally words are mis-transcribed. Review of Systems   Constitutional: Negative. HENT: Positive for congestion. Respiratory: Negative. Cardiovascular: Negative. Negative for chest pain, palpitations and leg swelling. Gastrointestinal: Negative. Endocrine: Negative. Negative for polydipsia, polyphagia and polyuria. Genitourinary: Negative. Musculoskeletal: Negative. Skin: Negative. Allergic/Immunologic: Positive for environmental allergies. Neurological: Negative. Hematological: Negative. Psychiatric/Behavioral: Negative for dysphoric mood. The patient is not nervous/anxious. All other systems reviewed and are negative. Objective:   Physical Exam  Vitals signs and nursing note reviewed. Constitutional:       Appearance: He is obese. HENT:      Right Ear: Tympanic membrane, ear canal and external ear normal.      Left Ear: Tympanic membrane, ear canal and external ear normal.      Nose: No congestion. Mouth/Throat:      Mouth: Mucous membranes are moist.      Pharynx: Oropharynx is clear. Eyes:      Conjunctiva/sclera: Conjunctivae normal.      Pupils: Pupils are equal, round, and reactive to light. Neck:      Vascular: No carotid bruit. Cardiovascular:      Rate and Rhythm: Normal rate and regular rhythm. Pulses: Normal pulses. Heart sounds: Normal heart sounds.    Pulmonary: Breath sounds: Normal breath sounds. Abdominal:      General: Bowel sounds are normal.   Musculoskeletal:      Right lower leg: No edema. Left lower leg: No edema. Skin:     General: Skin is warm and dry. Capillary Refill: Capillary refill takes less than 2 seconds. Neurological:      General: No focal deficit present. Mental Status: He is alert and oriented to person, place, and time. Psychiatric:         Mood and Affect: Mood normal.         Assessment:       Diagnosis Orders   1. Type 2 diabetes mellitus without complication, with long-term current use of insulin (HCC)  insulin glargine (LANTUS SOLOSTAR) 100 UNIT/ML injection pen    empagliflozin (JARDIANCE) 25 MG tablet    POCT glycosylated hemoglobin (Hb A1C)   2. Essential hypertension     3. Mixed hyperlipidemia     4. Dysthymia  sertraline (ZOLOFT) 50 MG tablet   5. Class 2 severe obesity due to excess calories with serious comorbidity and body mass index (BMI) of 37.0 to 37.9 in adult ([facility]     6. Medication monitoring encounter  POCT glycosylated hemoglobin (Hb A1C)           Plan:      Orders Placed This Encounter   Procedures    POCT glycosylated hemoglobin (Hb A1C)     Medications Discontinued During This Encounter   Medication Reason    insulin glargine (LANTUS SOLOSTAR) 100 UNIT/ML injection pen REORDER    empagliflozin (JARDIANCE) 25 MG tablet REORDER    sertraline (ZOLOFT) 50 MG tablet REORDER     Current Outpatient Medications   Medication Sig Dispense Refill    insulin glargine (LANTUS SOLOSTAR) 100 UNIT/ML injection pen Inject 45 Units into the skin 2 times daily 45 pen 1    empagliflozin (JARDIANCE) 25 MG tablet Take 1 tablet by mouth once daily 90 tablet 3    sertraline (ZOLOFT) 50 MG tablet Take 1 tablet by mouth daily 30 tablet 5    insulin lispro, 1 Unit Dial, (HUMALOG KWIKPEN) 100 UNIT/ML SOPN Dose per sliding scale. Max dose 100 units daily.  30 pen 3    HUMALOG KWIKPEN 100 UNIT/ML SOPN USE PER SLIDING SCALE,  MAXIMUM  UNITS PER   mL 11    valsartan (DIOVAN) 160 MG tablet Take 1 tablet by mouth daily 90 tablet 3    aspirin 81 MG EC tablet Take 81 mg by mouth daily      simvastatin (ZOCOR) 40 MG tablet TAKE 1 TABLET BY MOUTH  NIGHTLY 120 tablet 3    fenofibrate (TRICOR) 145 MG tablet TAKE 1 TABLET BY MOUTH  DAILY 120 tablet 3    Multiple Vitamins-Minerals (MULTIVITAMIN ADULTS PO) Take by mouth      Insulin Pen Needle (B-D ULTRAFINE III SHORT PEN) 31G X 8 MM MISC Use as directed for insulin 200 each 3    acetaminophen (TYLENOL) 500 MG tablet Take 1,000 mg by mouth every 6 hours as needed for Pain       No current facility-administered medications for this visit. Get lab panels this week. Continue present medications. Keiry Hoyos received counseling on the following healthy behaviors: nutrition and exercise    Patient given educational materials on Diabetes    I have instructed Keiry Hoyos to complete a self tracking handout on Blood Sugars  and instructed them to bring it with them to his next appointment. Discussed use, benefit, and side effects of prescribed medications. Barriers to medication compliance addressed. All patient questions answered. Pt voiced understanding.                Sandy Nevarez MD

## 2020-08-31 NOTE — PATIENT INSTRUCTIONS
You may receive a survey regarding the care you received during your visit. Your input is valuable to us. We encourage you to complete and return your survey. We hope you will choose us in the future for your healthcare needs. Get lab panels this week. Continue present medications.

## 2020-08-31 NOTE — PROGRESS NOTES
Chronic Disease Visit Information    BP Readings from Last 3 Encounters:   05/29/20 128/72   02/26/20 120/78   01/16/20 126/82          Hemoglobin A1C (%)   Date Value   05/29/2020 10.6   02/26/2020 9.1   01/16/2020 11.6     LDL Calculated (mg/dL)   Date Value   07/20/2019 98     HDL   Date Value   07/20/2019 33 mg/dL (L)   02/27/2012 39 mg/dl     BUN (mg/dL)   Date Value   07/20/2019 23     CREATININE (mg/dL)   Date Value   07/20/2019 1.28     Glucose (mg/dL)   Date Value   07/20/2019 248 (H)            Have you changed or started any medications since your last visit including any over-the-counter medicines, vitamins, or herbal medicines? no   Are you having any side effects from any of your medications? -  no  Have you stopped taking any of your medications? Is so, why? -  no    Have you seen any other physician or provider since your last visit? No  Have you had any other diagnostic tests since your last visit? No  Have you been seen in the emergency room and/or had an admission to a hospital since we last saw you? No  Have you had your annual diabetic retinal (eye) exam? Yes - Records Obtained  Have you had your routine dental cleaning in the past 6 months? no    Have you activated your [employer] account? If not, what are your barriers?  Yes     Patient Care Team:  My Alves MD as PCP - General (Family Medicine)  My Alves MD as PCP - [facility] Provider  Tae Donis MD as Consulting Physician (Nephrology)         Medical History Review  Past Medical, Family, and Social History reviewed and does contribute to the patient presenting condition    Health Maintenance   Topic Date Due    Pneumococcal 0-64 years Vaccine (1 of 1 - PPSV23) 11/08/1976    Hepatitis B vaccine (1 of 3 - Risk 3-dose series) 11/08/1989    DTaP/Tdap/Td vaccine (1 - Tdap) 11/08/1989    Diabetic microalbuminuria test  04/06/2018    Diabetic foot exam  07/17/2020    Lipid screen  07/20/2020    Potassium monitoring  07/20/2020

## 2020-11-16 RX ORDER — SIMVASTATIN 40 MG
40 TABLET ORAL NIGHTLY
Qty: 120 TABLET | Refills: 2 | Status: SHIPPED | OUTPATIENT
Start: 2020-11-16 | End: 2021-01-07

## 2020-11-16 RX ORDER — FENOFIBRATE 145 MG/1
145 TABLET, COATED ORAL DAILY
Qty: 120 TABLET | Refills: 2 | Status: SHIPPED | OUTPATIENT
Start: 2020-11-16 | End: 2021-10-12 | Stop reason: SDUPTHER

## 2020-12-22 NOTE — TELEPHONE ENCOUNTER
Requested Prescriptions     Pending Prescriptions Disp Refills    sertraline (ZOLOFT) 50 MG tablet 30 tablet 5     Sig: Take 1 tablet by mouth daily

## 2021-01-07 ENCOUNTER — OFFICE VISIT (OUTPATIENT)
Dept: FAMILY MEDICINE CLINIC | Age: 51
End: 2021-01-07
Payer: COMMERCIAL

## 2021-01-07 VITALS
TEMPERATURE: 96.5 F | WEIGHT: 262.7 LBS | SYSTOLIC BLOOD PRESSURE: 124 MMHG | DIASTOLIC BLOOD PRESSURE: 68 MMHG | OXYGEN SATURATION: 98 % | BODY MASS INDEX: 35.63 KG/M2 | HEART RATE: 98 BPM | RESPIRATION RATE: 15 BRPM

## 2021-01-07 DIAGNOSIS — E66.01 CLASS 2 SEVERE OBESITY DUE TO EXCESS CALORIES WITH SERIOUS COMORBIDITY AND BODY MASS INDEX (BMI) OF 37.0 TO 37.9 IN ADULT (HCC): ICD-10-CM

## 2021-01-07 DIAGNOSIS — E11.9 TYPE 2 DIABETES MELLITUS WITHOUT COMPLICATION, WITH LONG-TERM CURRENT USE OF INSULIN (HCC): Primary | ICD-10-CM

## 2021-01-07 DIAGNOSIS — Z79.4 TYPE 2 DIABETES MELLITUS WITHOUT COMPLICATION, WITH LONG-TERM CURRENT USE OF INSULIN (HCC): Primary | ICD-10-CM

## 2021-01-07 DIAGNOSIS — N18.31 STAGE 3A CHRONIC KIDNEY DISEASE (HCC): ICD-10-CM

## 2021-01-07 DIAGNOSIS — Z51.81 MEDICATION MONITORING ENCOUNTER: ICD-10-CM

## 2021-01-07 DIAGNOSIS — E78.2 MIXED HYPERLIPIDEMIA: ICD-10-CM

## 2021-01-07 DIAGNOSIS — F34.1 DYSTHYMIA: ICD-10-CM

## 2021-01-07 DIAGNOSIS — G47.26 RECURRENT CIRCADIAN RHYTHM SLEEP DISORDER, SHIFT WORK TYPE: ICD-10-CM

## 2021-01-07 DIAGNOSIS — I10 ESSENTIAL HYPERTENSION: ICD-10-CM

## 2021-01-07 PROCEDURE — G8427 DOCREV CUR MEDS BY ELIG CLIN: HCPCS | Performed by: NURSE PRACTITIONER

## 2021-01-07 PROCEDURE — 4004F PT TOBACCO SCREEN RCVD TLK: CPT | Performed by: NURSE PRACTITIONER

## 2021-01-07 PROCEDURE — 3046F HEMOGLOBIN A1C LEVEL >9.0%: CPT | Performed by: NURSE PRACTITIONER

## 2021-01-07 PROCEDURE — G8484 FLU IMMUNIZE NO ADMIN: HCPCS | Performed by: NURSE PRACTITIONER

## 2021-01-07 PROCEDURE — 3017F COLORECTAL CA SCREEN DOC REV: CPT | Performed by: NURSE PRACTITIONER

## 2021-01-07 PROCEDURE — 2022F DILAT RTA XM EVC RTNOPTHY: CPT | Performed by: NURSE PRACTITIONER

## 2021-01-07 PROCEDURE — G8417 CALC BMI ABV UP PARAM F/U: HCPCS | Performed by: NURSE PRACTITIONER

## 2021-01-07 PROCEDURE — 99214 OFFICE O/P EST MOD 30 MIN: CPT | Performed by: NURSE PRACTITIONER

## 2021-01-07 RX ORDER — VALSARTAN 160 MG/1
160 TABLET ORAL DAILY
Qty: 90 TABLET | Refills: 3 | Status: SHIPPED | OUTPATIENT
Start: 2021-01-07 | End: 2021-01-26 | Stop reason: SDUPTHER

## 2021-01-07 RX ORDER — PEN NEEDLE, DIABETIC 31 GX5/16"
NEEDLE, DISPOSABLE MISCELLANEOUS
Qty: 200 EACH | Refills: 3 | Status: SHIPPED | OUTPATIENT
Start: 2021-01-07

## 2021-01-07 RX ORDER — ASPIRIN 81 MG/1
81 TABLET ORAL DAILY
Qty: 90 TABLET | Refills: 3 | Status: SHIPPED | OUTPATIENT
Start: 2021-01-07 | End: 2021-11-18 | Stop reason: SDUPTHER

## 2021-01-07 RX ORDER — ATORVASTATIN CALCIUM 80 MG/1
80 TABLET, FILM COATED ORAL DAILY
Qty: 90 TABLET | Refills: 3 | Status: SHIPPED | OUTPATIENT
Start: 2021-01-07 | End: 2021-11-18 | Stop reason: SDUPTHER

## 2021-01-07 RX ORDER — PIOGLITAZONEHYDROCHLORIDE 45 MG/1
45 TABLET ORAL DAILY
Qty: 90 TABLET | Refills: 3 | Status: SHIPPED | OUTPATIENT
Start: 2021-01-07 | End: 2021-05-05

## 2021-01-07 ASSESSMENT — PATIENT HEALTH QUESTIONNAIRE - PHQ9
2. FEELING DOWN, DEPRESSED OR HOPELESS: 0
SUM OF ALL RESPONSES TO PHQ QUESTIONS 1-9: 0

## 2021-01-07 NOTE — PROGRESS NOTES
Subjective:      Patient ID: Halie Boyd 1970 is a 48 y.o. male here for evaluation. Chief Complaint   Patient presents with    3 Month Follow-Up    Diabetes    Hypertension    Discuss Labs    Colonoscopy     discuss        Patient Active Problem List   Diagnosis    DM (diabetes mellitus) ([address]    HTN (hypertension)    Hyperlipidemia    Medication monitoring encounter    Callus of foot, bilateral    Hypercalcemia    Allergic rhinitis       COLONOSCOPY - due    RENAL - Dr. Yusuf Rubin    Denies CP, SOB or chest tightness    Vitals:    01/07/21 1008   BP: 124/68   Pulse: 98   Resp: 15   Temp: 96.5 °F (35.8 °C)   SpO2: 98%        On Diovan 160 mg.     BP Readings from Last 3 Encounters:   01/07/21 124/68   08/31/20 114/60   05/29/20 128/72       On Jardiance 25 mg, Lantus 45 units BID and Humalog SS. Was taken off Metformin, Actos and Bydureon in past during an OLIVA hospitalization. Was not put back on. Works swing shift. Schedule all over place. No regular eating schedule. Lab Results   Component Value Date    LABA1C 11.6 (H) 01/04/2021    LABA1C 10.1 08/31/2020    LABA1C 10.6 05/29/2020     No results found for: EAG    On Fenofibrate 160 mg and Simvastatin 40 mg Daily. States compliant with medication.      No components found for: CHLPL  Lab Results   Component Value Date    TRIG 1,386 (H) 01/04/2021    TRIG 337 (H) 07/20/2019    TRIG 393 (H) 05/23/2018     Lab Results   Component Value Date    HDL 31 (L) 01/04/2021    HDL 33 (L) 07/20/2019    HDL 30 (L) 05/23/2018     Lab Results   Component Value Date    LDLCALC See Note 01/04/2021    LDLCALC 98 07/20/2019    LDLCALC 82 05/23/2018     Lab Results   Component Value Date    LABVLDL 277 (H) 01/04/2021    LABVLDL 67 (H) 07/20/2019    LABVLDL 79 (H) 05/23/2018         Chemistry        Component Value Date/Time     (L) 01/04/2021 1130    K 4.1 01/04/2021 1130    CL 95 (L) 01/04/2021 1130    CO2 27 01/04/2021 1130    BUN 36 (H) 01/04/2021 1130    CREATININE 1.42 (H) 01/04/2021 1130        Component Value Date/Time    CALCIUM 9.7 01/04/2021 1130    ALKPHOS 70 01/04/2021 1130    ALKPHOS 56 09/30/2017 0930    AST 16 01/04/2021 1130    ALT 21 01/04/2021 1130    BILITOT 0.4 01/04/2021 1130            No results found for: TSH, M8XNGCW, Z4TCVAL, THYROIDAB    Lab Results   Component Value Date    WBC 8.7 01/04/2021    HGB 16.5 01/04/2021    HCT 48.8 01/04/2021    MCV 86 01/04/2021     01/04/2021         Health Maintenance   Topic Date Due    Hepatitis C screen  1970    Pneumococcal 0-64 years Vaccine (1 of 1 - PPSV23) 11/08/1976    Hepatitis B vaccine (1 of 3 - Risk 3-dose series) 11/08/1989    DTaP/Tdap/Td vaccine (1 - Tdap) 11/08/1989    Flu vaccine (1) 09/01/2020    Shingles Vaccine (1 of 2) 11/08/2020    Colon cancer screen colonoscopy  11/08/2020    A1C test (Diabetic or Prediabetic)  04/04/2021    Diabetic retinal exam  05/20/2021    Diabetic microalbuminuria test  01/04/2022    Lipid screen  01/04/2022    Potassium monitoring  01/04/2022    Creatinine monitoring  01/04/2022    Diabetic foot exam  01/07/2022    HIV screen  Addressed    Hepatitis A vaccine  Aged Out    Hib vaccine  Aged Out    Meningococcal (ACWY) vaccine  Aged Out         There is no immunization history on file for this patient. Review of Systems   Constitutional: Negative for chills and fever. HENT: Negative. Respiratory: Negative for cough and shortness of breath. Cardiovascular: Negative for chest pain. Gastrointestinal: Negative for abdominal pain and nausea. Skin: Negative for rash. Neurological: Negative for dizziness, light-headedness and headaches. Psychiatric/Behavioral: Negative. Objective:   Physical Exam  Constitutional:       General: He is not in acute distress. Eyes:      Pupils: Pupils are equal, round, and reactive to light. Neck:      Musculoskeletal: Normal range of motion and neck supple. Cardiovascular:      Rate and Rhythm: Normal rate and regular rhythm. Heart sounds: No murmur. Pulmonary:      Effort: Pulmonary effort is normal.      Breath sounds: Normal breath sounds. No wheezing. Abdominal:      General: Bowel sounds are normal. There is no distension. Palpations: Abdomen is soft. Tenderness: There is no abdominal tenderness. Musculoskeletal: Normal range of motion. General: No tenderness. Skin:     General: Skin is warm and dry. Findings: No rash. Assessment:       Diagnosis Orders   1. Type 2 diabetes mellitus without complication, with long-term current use of insulin ([facility])   DIABETES FOOT EXAM    pioglitazone (ACTOS) 45 MG tablet    valsartan (DIOVAN) 160 MG tablet    Basic Metabolic Panel    Hemoglobin A1C   2. Dysthymia  sertraline (ZOLOFT) 50 MG tablet   3. Essential hypertension  valsartan (DIOVAN) 160 MG tablet   4. Mixed hyperlipidemia  atorvastatin (LIPITOR) 80 MG tablet    Lipid Panel   5. Stage 3a chronic kidney disease     6. Class 2 severe obesity due to excess calories with serious comorbidity and body mass index (BMI) of 37.0 to 37.9 in adult ([facility] [address]     7. Recurrent circadian rhythm sleep disorder, shift work type     8.  Medication monitoring encounter  Insulin Pen Needle (B-D ULTRAFINE III SHORT PEN) 31G X 8 MM MISC           Plan:      Labs reviewed  Chronic conditions stable except DM  Question compliance with elevation in DM and TRIG  Refills as above   - switch to Atorvastatin 80 mg   - add on Actos  Denies GLP-1 for DM management  Diet, exercise and weight loss stress  CRS options discussed  Follow up with Specialists  Labs as above in 4 months  RTO in 4 months          Current Outpatient Medications   Medication Sig Dispense Refill    pioglitazone (ACTOS) 45 MG tablet Take 1 tablet by mouth daily 90 tablet 3    atorvastatin (LIPITOR) 80 MG tablet Take 1 tablet by mouth daily 90 tablet 3    sertraline (ZOLOFT) 50 MG tablet Take 1 tablet by mouth daily 30 tablet 5    valsartan (DIOVAN) 160 MG tablet Take 1 tablet by mouth daily 90 tablet 3    aspirin 81 MG EC tablet Take 1 tablet by mouth daily 90 tablet 3    Insulin Pen Needle (B-D ULTRAFINE III SHORT PEN) 31G X 8 MM MISC Use as directed for insulin 200 each 3    fenofibrate (TRICOR) 145 MG tablet TAKE 1 TABLET BY MOUTH  DAILY 120 tablet 2    insulin glargine (LANTUS SOLOSTAR) 100 UNIT/ML injection pen Inject 45 Units into the skin 2 times daily 45 pen 1    empagliflozin (JARDIANCE) 25 MG tablet Take 1 tablet by mouth once daily 90 tablet 3    insulin lispro, 1 Unit Dial, (HUMALOG KWIKPEN) 100 UNIT/ML SOPN Dose per sliding scale. Max dose 100 units daily. 30 pen 3    HUMALOG KWIKPEN 100 UNIT/ML SOPN USE PER SLIDING SCALE,  MAXIMUM  UNITS PER   mL 11    Multiple Vitamins-Minerals (MULTIVITAMIN ADULTS PO) Take by mouth      acetaminophen (TYLENOL) 500 MG tablet Take 1,000 mg by mouth every 6 hours as needed for Pain       No current facility-administered medications for this visit.

## 2021-01-08 ASSESSMENT — ENCOUNTER SYMPTOMS
NAUSEA: 0
COUGH: 0
ABDOMINAL PAIN: 0
SHORTNESS OF BREATH: 0

## 2021-01-26 DIAGNOSIS — I10 ESSENTIAL HYPERTENSION: ICD-10-CM

## 2021-01-26 DIAGNOSIS — Z79.4 TYPE 2 DIABETES MELLITUS WITHOUT COMPLICATION, WITH LONG-TERM CURRENT USE OF INSULIN (HCC): ICD-10-CM

## 2021-01-26 DIAGNOSIS — E11.9 TYPE 2 DIABETES MELLITUS WITHOUT COMPLICATION, WITH LONG-TERM CURRENT USE OF INSULIN (HCC): ICD-10-CM

## 2021-01-26 RX ORDER — VALSARTAN 160 MG/1
160 TABLET ORAL DAILY
Qty: 90 TABLET | Refills: 3 | Status: SHIPPED | OUTPATIENT
Start: 2021-01-26 | End: 2021-11-18 | Stop reason: SDUPTHER

## 2021-01-26 NOTE — TELEPHONE ENCOUNTER
Wife called back in and stated that pt is wanting the script to be sent to the mail service delivery Not [facility] pharmacy please follow up

## 2021-01-26 NOTE — TELEPHONE ENCOUNTER
Molly Stubbs called requesting a refill on the following medications:  Requested Prescriptions     Pending Prescriptions Disp Refills    valsartan (DIOVAN) 160 MG tablet 90 tablet 3     Sig: Take 1 tablet by mouth daily     Pharmacy verified:YES  .pv      Date of last visit:   Date of next visit (if applicable): 4/8/5156

## 2021-02-08 ENCOUNTER — TELEPHONE (OUTPATIENT)
Dept: FAMILY MEDICINE CLINIC | Age: 51
End: 2021-02-08

## 2021-02-08 NOTE — TELEPHONE ENCOUNTER
Melanie Gonzalez with [facility] was notified and voiced understanding. She will fax over the paperwork.

## 2021-02-08 NOTE — TELEPHONE ENCOUNTER
Veto Plants with [facility] called office stating pt is trying to get a glucometer and supplies through them for the One Touch Verio. She is asking for a verbal ok from you. Once she gets a verbal she will fax over a form for your sigature. Call her back at [phone] with Reference# [id number]. Please advise.

## 2021-03-07 ENCOUNTER — APPOINTMENT (OUTPATIENT)
Dept: GENERAL RADIOLOGY | Age: 51
End: 2021-03-07
Payer: COMMERCIAL

## 2021-03-07 ENCOUNTER — HOSPITAL ENCOUNTER (EMERGENCY)
Age: 51
Discharge: HOME | End: 2021-03-07
Payer: COMMERCIAL

## 2021-03-07 VITALS
WEIGHT: 258 LBS | HEART RATE: 80 BPM | SYSTOLIC BLOOD PRESSURE: 137 MMHG | BODY MASS INDEX: 34.95 KG/M2 | HEIGHT: 72 IN | TEMPERATURE: 98.6 F | DIASTOLIC BLOOD PRESSURE: 92 MMHG | OXYGEN SATURATION: 97 % | RESPIRATION RATE: 18 BRPM

## 2021-03-07 DIAGNOSIS — M25.511 ACUTE PAIN OF RIGHT SHOULDER: Primary | ICD-10-CM

## 2021-03-07 PROCEDURE — 73030 X-RAY EXAM OF SHOULDER: CPT

## 2021-03-07 PROCEDURE — 99283 EMERGENCY DEPT VISIT LOW MDM: CPT

## 2021-03-07 PROCEDURE — 6370000000 HC RX 637 (ALT 250 FOR IP): Performed by: PHYSICIAN ASSISTANT

## 2021-03-07 RX ORDER — LIDOCAINE 4 G/G
1 PATCH TOPICAL ONCE
Status: DISCONTINUED | OUTPATIENT
Start: 2021-03-07 | End: 2021-03-07 | Stop reason: HOSPADM

## 2021-03-07 RX ORDER — HYDROCODONE BITARTRATE AND ACETAMINOPHEN 5; 325 MG/1; MG/1
1 TABLET ORAL EVERY 6 HOURS PRN
Qty: 6 TABLET | Refills: 0 | Status: SHIPPED | OUTPATIENT
Start: 2021-03-07 | End: 2021-03-09

## 2021-03-07 RX ORDER — LIDOCAINE 50 MG/G
1 PATCH TOPICAL DAILY
Qty: 15 PATCH | Refills: 0 | Status: SHIPPED | OUTPATIENT
Start: 2021-03-07 | End: 2021-05-05

## 2021-03-07 RX ORDER — NAPROXEN 500 MG/1
500 TABLET ORAL 2 TIMES DAILY
Qty: 60 TABLET | Refills: 0 | Status: SHIPPED | OUTPATIENT
Start: 2021-03-07 | End: 2021-11-18

## 2021-03-07 RX ORDER — HYDROCODONE BITARTRATE AND ACETAMINOPHEN 5; 325 MG/1; MG/1
1 TABLET ORAL ONCE
Status: COMPLETED | OUTPATIENT
Start: 2021-03-07 | End: 2021-03-07

## 2021-03-07 RX ADMIN — HYDROCODONE BITARTRATE AND ACETAMINOPHEN 1 TABLET: 5; 325 TABLET ORAL at 07:42

## 2021-03-07 ASSESSMENT — ENCOUNTER SYMPTOMS
CHEST TIGHTNESS: 0
SHORTNESS OF BREATH: 0
ABDOMINAL PAIN: 0
NAUSEA: 0
VOMITING: 0
BACK PAIN: 0

## 2021-03-07 ASSESSMENT — PAIN SCALES - GENERAL: PAINLEVEL_OUTOF10: 7

## 2021-03-07 ASSESSMENT — PAIN DESCRIPTION - ORIENTATION: ORIENTATION: RIGHT

## 2021-03-07 ASSESSMENT — PAIN DESCRIPTION - LOCATION: LOCATION: SHOULDER

## 2021-03-07 NOTE — ED TRIAGE NOTES
Pt comes to ED from work with c/o right shoulder pain. PT states around midnight to 1 am pt noticed his right shoulder bothering him. Pt states around 3 am his coworker noticed some swelling. Pt states he can feel a lump on the top of the shoulder. Pt denies chest pain and SOB. Pt states it is hard to move it. Pt states it hurts to the touch and rates it a 7 out of 10.

## 2021-03-07 NOTE — ED PROVIDER NOTES
[facility]  eMERGENCY dEPARTMENT eNCOUnter          CHIEF COMPLAINT       Chief Complaint   Patient presents with    Shoulder Injury       Nurses Notes reviewed and I agree except as noted inthe HPI. HISTORY OF PRESENT ILLNESS    Sherman Henderson is a 48 y.o. male who presents to the Emergency Department for the evaluation of right shoulder pain. Patient states around midnight he was working when he began to experience achy right shoulder pain located primarily anterior. He continued to work with slight discomfort that progressed in intensity until around 3 am when the pain became very intense and he left work and went home. The pain is of the anterior shoulder and he can almost pinpoint the pain at the greater tuberosity/intertubercular groove. Patient went home to take tylenol or Ibuprofen for his pain and ice, but he was so tender to the anterior shoulder that he could not tolerate the ice. He then presented to the ED. He denies any shoulder trauma, previous injuries, feeling of a pop or tear or history of shoulder pain prior to this, elbow pain, hand pain, neck pain, abdominal pain, chest pain, numbness or tingling and global right sided weakness. No recent activity changes or change in job duties. Past medical history significant for diabetes. The HPI was provided by the patient. REVIEW OF SYSTEMS     Review of Systems   Respiratory: Negative for chest tightness and shortness of breath. Cardiovascular: Negative for chest pain and palpitations. Gastrointestinal: Negative for abdominal pain, nausea and vomiting. Musculoskeletal: Positive for arthralgias (Right shoulder) and joint swelling (Very mild right shoulder swelling). Negative for back pain, myalgias and neck pain. Weakness since pain onset of right shoulder   Neurological: Negative for dizziness, facial asymmetry, light-headedness, numbness and headaches. All other systems reviewed and are negative.       PAST MEDICAL HISTORY    has a past medical history of OLIVA (acute kidney injury) ([facility] [address] surgery for scotal abscess, Hyperlipidemia, Hypertension, Scrotal abscess, Tobacco abuse, and Type II or unspecified type diabetes mellitus without mention of complication, not stated as uncontrolled. SURGICAL HISTORY      has a past surgical history that includes Rectal surgery and Abscess Drainage (10/02/2017). CURRENT MEDICATIONS       Discharge Medication List as of 3/7/2021  8:31 AM      CONTINUE these medications which have NOT CHANGED    Details   valsartan (DIOVAN) 160 MG tablet Take 1 tablet by mouth daily, Disp-90 tablet, R-3Normal      pioglitazone (ACTOS) 45 MG tablet Take 1 tablet by mouth daily, Disp-90 tablet, R-3Normal      atorvastatin (LIPITOR) 80 MG tablet Take 1 tablet by mouth daily, Disp-90 tablet, R-3Normal      sertraline (ZOLOFT) 50 MG tablet Take 1 tablet by mouth daily, Disp-30 tablet, R-5Normal      aspirin 81 MG EC tablet Take 1 tablet by mouth daily, Disp-90 tablet, R-3Normal      Insulin Pen Needle (B-D ULTRAFINE III SHORT PEN) 31G X 8 MM MISC Disp-200 each, R-3, NormalUse as directed for insulin      fenofibrate (TRICOR) 145 MG tablet TAKE 1 TABLET BY MOUTH  DAILY, Disp-120 tablet, R-2Requesting 1 year supplyNormal      insulin glargine (LANTUS SOLOSTAR) 100 UNIT/ML injection pen Inject 45 Units into the skin 2 times daily, Disp-45 pen,R-1Normal      empagliflozin (JARDIANCE) 25 MG tablet Take 1 tablet by mouth once daily, Disp-90 tablet,R-3Normal      !! insulin lispro, 1 Unit Dial, (HUMALOG KWIKPEN) 100 UNIT/ML SOPN Dose per sliding scale. Max dose 100 units daily. , Disp-30 pen, R-3Normal      !! HUMALOG KWIKPEN 100 UNIT/ML SOPN USE PER SLIDING SCALE,  MAXIMUM  UNITS PER  DAY, Disp-120 mL, R-11, DAWNormal      Multiple Vitamins-Minerals (MULTIVITAMIN ADULTS PO) Take by mouthHistorical Med      acetaminophen (TYLENOL) 500 MG tablet Take 1,000 mg by mouth every 6 hours as needed for Pain !! - Potential duplicate medications found. Please discuss with provider. ALLERGIES     is allergic to lisinopril. FAMILY HISTORY     He indicated that his mother is alive. He indicated that his father is alive. family history includes Heart Attack in his father. SOCIAL HISTORY      reports that he has been smoking cigarettes. He started smoking about 35 years ago. He has a 4.00 pack-year smoking history. He has never used smokeless tobacco. He reports current alcohol use. He reports that he does not use drugs. PHYSICAL EXAM     INITIAL VITALS:  height is 6' (1.829 m) and weight is 258 lb (117 kg). His temperature is 98.6 °F (37 °C). His blood pressure is 137/92 (abnormal) and his pulse is 80. His respiration is 18 and oxygen saturation is 97%. Physical Exam  Vitals signs and nursing note reviewed. Constitutional:       Appearance: Normal appearance. HENT:      Head: Normocephalic. Eyes:      Conjunctiva/sclera: Conjunctivae normal.   Neck:      Musculoskeletal: Normal range of motion. Cardiovascular:      Rate and Rhythm: Normal rate and regular rhythm. Heart sounds: Normal heart sounds. Pulmonary:      Effort: Pulmonary effort is normal.      Breath sounds: Normal breath sounds. Chest:      Chest wall: No deformity, swelling or tenderness. Abdominal:      General: Bowel sounds are normal. There is no distension. Tenderness: There is no abdominal tenderness. Musculoskeletal:         General: Swelling (Slight right shoulder swelling) and tenderness present. No deformity or signs of injury (Significant TTP around the greater tuberosity). Right shoulder: He exhibits decreased range of motion (Pain limits active forward flexion, near full ROM with passive), tenderness, bony tenderness, swelling and pain. He exhibits no crepitus and no deformity.       Cervical back: Normal.      Right upper arm: Normal.        Arms:       Comments: Physical exam is significant for TTP at greater tuberosity, limited active ROM, near full passive ROM, and significant pain and weakness with forward flexion of right shoulder. Neurological:      General: No focal deficit present. Mental Status: He is alert and oriented to person, place, and time. Psychiatric:         Mood and Affect: Mood normal.         DIFFERENTIAL DIAGNOSIS:   Differential diagnoses are discussed    DIAGNOSTIC RESULTS     EKG: All EKG's are interpreted by the Emergency Department Physician who either signs or Co-signsthis chart in the absence of a cardiologist.          RADIOLOGY: non-plain film images(s) such as CT, Ultrasound and MRI are read by the radiologist.    XR SHOULDER RIGHT (MIN 2 VIEWS)   Final Result       1. Mild degenerative changes. 2. No acute findings. **This report has been created using voice recognition software. It may contain minor errors which are inherent in voice recognition technology. **      Final report electronically signed by Dr. Jose Thompson on 3/7/2021 8:05 AM          LABS:    Labs Reviewed - No data to display    EMERGENCY DEPARTMENT COURSE:   Vitals:    Vitals:    03/07/21 0704 03/07/21 0744   BP: (!) 137/92    Pulse: 86 80   Resp: 20 18   Temp: 98.6 °F (37 °C)    SpO2: 97%    Weight: 258 lb (117 kg)    Height: 6' (1.829 m)       7:36 AM EST: The patient was seen and evaluated. Patient presents for complaints of atraumatic pain of the right shoulder. No associated abdominal pain or cardiopulmonary symptoms. He is neurovascularly intact on examination is focal tenderness over the anterior and anterolateral shoulder. He did have some decreased range of motion secondary to pain on initial examination and this is resolved after Norco and lidocaine patch with follow-up examination showing full active range of motion. Suspect strain of the shoulder. Advised RICE.   Follow-up arranged with occupational health and he will be discharged with NSAID, lidocaine patch, short course of pain control. Return precautions were discussed and he denied further needs upon discharge. CRITICAL CARE:   None    CONSULTS:  None    PROCEDURES:  None    FINAL IMPRESSION      1. Acute pain of right shoulder          DISPOSITION/PLAN   Discharge    PATIENT REFERRED TO:  Rajesh  Marsha Shawn [facility] [address]  In 1 day  Appointment scheduled for Monday, 3/8/2021 at 9:30 AM    [facility] EMERGENCY DEPT  [place] [id number] [phone]    If symptoms worsen      DISCHARGEMEDICATIONS:  Discharge Medication List as of 3/7/2021  8:31 AM      START taking these medications    Details   naproxen (NAPROSYN) 500 MG tablet Take 1 tablet by mouth 2 times daily Do not take with ibuprofen, advil, motrin, or aleve., Disp-60 tablet, R-0Print      lidocaine (LIDODERM) 5 % Place 1 patch onto the skin daily 12 hours on, 12 hours off., Disp-15 patch, R-0Print      HYDROcodone-acetaminophen (NORCO) 5-325 MG per tablet Take 1 tablet by mouth every 6 hours as needed for Pain for up to 2 days. WARNING: This medication may make you drowsy.  Do not operate heavy machinery or motor vehicles during use., Disp-6 tablet, R-0Print             (Please note that portions of this note were completedwith a voice recognition program.  Efforts were made to edit the dictations but occasionally words are mis-transcribed.)        Julita Rodas PA-C  03/08/21 1056

## 2021-03-08 ENCOUNTER — HOSPITAL ENCOUNTER (OUTPATIENT)
Age: 51
Discharge: HOME | End: 2021-03-08

## 2021-03-08 ENCOUNTER — TELEPHONE (OUTPATIENT)
Dept: FAMILY MEDICINE CLINIC | Age: 51
End: 2021-03-08

## 2021-04-28 ENCOUNTER — TELEPHONE (OUTPATIENT)
Dept: FAMILY MEDICINE CLINIC | Age: 51
End: 2021-04-28

## 2021-04-28 NOTE — TELEPHONE ENCOUNTER
L/M for pt to call office. Called patient for pre visit planning. Pt has appt on 5/5/21 at 10:30 am.  Please remind pt of appt date and time and check to see if they had their lab work done.

## 2021-05-05 ENCOUNTER — OFFICE VISIT (OUTPATIENT)
Dept: FAMILY MEDICINE CLINIC | Age: 51
End: 2021-05-05
Payer: COMMERCIAL

## 2021-05-05 VITALS
BODY MASS INDEX: 35.1 KG/M2 | DIASTOLIC BLOOD PRESSURE: 80 MMHG | WEIGHT: 258.8 LBS | HEART RATE: 76 BPM | SYSTOLIC BLOOD PRESSURE: 138 MMHG | RESPIRATION RATE: 20 BRPM

## 2021-05-05 DIAGNOSIS — Z79.4 TYPE 2 DIABETES MELLITUS WITHOUT COMPLICATION, WITH LONG-TERM CURRENT USE OF INSULIN (HCC): Primary | ICD-10-CM

## 2021-05-05 DIAGNOSIS — I10 ESSENTIAL HYPERTENSION: ICD-10-CM

## 2021-05-05 DIAGNOSIS — N18.31 STAGE 3A CHRONIC KIDNEY DISEASE (HCC): ICD-10-CM

## 2021-05-05 DIAGNOSIS — E11.9 TYPE 2 DIABETES MELLITUS WITHOUT COMPLICATION, WITH LONG-TERM CURRENT USE OF INSULIN (HCC): Primary | ICD-10-CM

## 2021-05-05 DIAGNOSIS — F34.1 DYSTHYMIA: ICD-10-CM

## 2021-05-05 DIAGNOSIS — E78.2 MIXED HYPERLIPIDEMIA: ICD-10-CM

## 2021-05-05 DIAGNOSIS — E66.01 CLASS 2 SEVERE OBESITY DUE TO EXCESS CALORIES WITH SERIOUS COMORBIDITY AND BODY MASS INDEX (BMI) OF 37.0 TO 37.9 IN ADULT (HCC): ICD-10-CM

## 2021-05-05 PROCEDURE — 4004F PT TOBACCO SCREEN RCVD TLK: CPT | Performed by: NURSE PRACTITIONER

## 2021-05-05 PROCEDURE — G8417 CALC BMI ABV UP PARAM F/U: HCPCS | Performed by: NURSE PRACTITIONER

## 2021-05-05 PROCEDURE — 3017F COLORECTAL CA SCREEN DOC REV: CPT | Performed by: NURSE PRACTITIONER

## 2021-05-05 PROCEDURE — 99214 OFFICE O/P EST MOD 30 MIN: CPT | Performed by: NURSE PRACTITIONER

## 2021-05-05 PROCEDURE — 2022F DILAT RTA XM EVC RTNOPTHY: CPT | Performed by: NURSE PRACTITIONER

## 2021-05-05 PROCEDURE — G8427 DOCREV CUR MEDS BY ELIG CLIN: HCPCS | Performed by: NURSE PRACTITIONER

## 2021-05-05 PROCEDURE — 3046F HEMOGLOBIN A1C LEVEL >9.0%: CPT | Performed by: NURSE PRACTITIONER

## 2021-05-05 ASSESSMENT — ENCOUNTER SYMPTOMS
ABDOMINAL PAIN: 0
NAUSEA: 0
SHORTNESS OF BREATH: 0
COUGH: 0

## 2021-05-05 NOTE — PROGRESS NOTES
(1 - Tdap) Never done    Shingles Vaccine (1 of 2) Never done    Colon cancer screen colonoscopy  Never done    A1C test (Diabetic or Prediabetic)  04/04/2021    Diabetic retinal exam  05/20/2021    Flu vaccine (Season Ended) 09/01/2021    Diabetic microalbuminuria test  01/04/2022    Lipid screen  01/04/2022    Potassium monitoring  01/04/2022    Creatinine monitoring  01/04/2022    Diabetic foot exam  01/07/2022    HIV screen  Addressed    Hepatitis A vaccine  Aged Out    Hib vaccine  Aged Out    Meningococcal (ACWY) vaccine  Aged Out

## 2021-05-05 NOTE — PROGRESS NOTES
Subjective:      Patient ID: Joy Hunter 1970 is a 48 y.o. male here for evaluation. Chief Complaint   Patient presents with    Follow-up     4mo follow up DM       Patient Active Problem List   Diagnosis    DM (diabetes mellitus) ([address]    HTN (hypertension)    Hyperlipidemia    Medication monitoring encounter    Callus of foot, bilateral    Hypercalcemia    Allergic rhinitis       COLONOSCOPY - due    RENAL - Dr. Caridad Davis    Denies CP, SOB or chest tightness    Vitals:    05/05/21 1029   BP: 138/80   Pulse: 76   Resp: 20        On Diovan 160 mg.     BP Readings from Last 3 Encounters:   05/05/21 138/80   03/07/21 (!) 137/92   01/07/21 124/68       On Jardiance 25 mg, Lantus 45 units BID and Humalog SS. Was placed back on Actos last visit for elevated sugars bu pharmacy did not send - has not been taking. More active. BS running lower than previous. Wt Readings from Last 3 Encounters:   05/05/21 258 lb 12.8 oz (117.4 kg)   03/07/21 258 lb (117 kg)   01/07/21 262 lb 11.2 oz (119.2 kg)       Was taken off Metformin, Actos and Bydureon in past during an OLIVA hospitalization. Was not put back on at that time. Works swing shift. Schedule all over place. No regular eating schedule. Lab Results   Component Value Date    LABA1C 11.6 (H) 01/04/2021    LABA1C 10.1 08/31/2020    LABA1C 10.6 05/29/2020     No results found for: EAG    On Fenofibrate 160 mg and Simvastatin 40 mg Daily. States compliant with medication.      No components found for: CHLPL  Lab Results   Component Value Date    TRIG 1,386 (H) 01/04/2021    TRIG 337 (H) 07/20/2019    TRIG 393 (H) 05/23/2018     Lab Results   Component Value Date    HDL 31 (L) 01/04/2021    HDL 33 (L) 07/20/2019    HDL 30 (L) 05/23/2018     Lab Results   Component Value Date    LDLCALC See Note 01/04/2021    LDLCALC 98 07/20/2019    LDLCALC 82 05/23/2018     Lab Results   Component Value Date    LABVLDL 277 (H) 01/04/2021    LABVLDL 67 (H) 07/20/2019 Psychiatric/Behavioral: Negative. Objective:   Physical Exam  Constitutional:       General: He is not in acute distress. Eyes:      Pupils: Pupils are equal, round, and reactive to light. Neck:      Musculoskeletal: Normal range of motion and neck supple. Cardiovascular:      Rate and Rhythm: Normal rate and regular rhythm. Heart sounds: No murmur. Pulmonary:      Effort: Pulmonary effort is normal.      Breath sounds: Normal breath sounds. No wheezing. Abdominal:      General: Bowel sounds are normal. There is no distension. Palpations: Abdomen is soft. Tenderness: There is no abdominal tenderness. Musculoskeletal: Normal range of motion. General: No tenderness. Skin:     General: Skin is warm and dry. Findings: No rash. Assessment:       Diagnosis Orders   1. Type 2 diabetes mellitus without complication, with long-term current use of insulin ([facility] [address]     2. Essential hypertension     3. Mixed hyperlipidemia     4. Stage 3a chronic kidney disease     5. Class 2 severe obesity due to excess calories with serious comorbidity and body mass index (BMI) of 37.0 to 37.9 in adult ([address]     6. Dysthymia  sertraline (ZOLOFT) 50 MG tablet           Plan:      Chronic conditions stable except DM   - admits to poor diet, lack of exercise, use of insulin  Refills as above   - hold on Actos until labs completed  Denies GLP-1 for DM management  Diet, exercise and weight loss stress  CRS options discussed  Follow up with Specialists  Encouraged to get labs ASAP  RTO in 4 months - A1C in office          Current Outpatient Medications   Medication Sig Dispense Refill    Cyanocobalamin (VITAMIN B12 PO) Take by mouth      sertraline (ZOLOFT) 50 MG tablet Take 1 tablet by mouth daily 90 tablet 3    naproxen (NAPROSYN) 500 MG tablet Take 1 tablet by mouth 2 times daily Do not take with ibuprofen, advil, motrin, or aleve.  60 tablet 0    valsartan (DIOVAN) 160 MG tablet Take 1 tablet by mouth daily 90 tablet 3    atorvastatin (LIPITOR) 80 MG tablet Take 1 tablet by mouth daily 90 tablet 3    aspirin 81 MG EC tablet Take 1 tablet by mouth daily 90 tablet 3    Insulin Pen Needle (B-D ULTRAFINE III SHORT PEN) 31G X 8 MM MISC Use as directed for insulin 200 each 3    fenofibrate (TRICOR) 145 MG tablet TAKE 1 TABLET BY MOUTH  DAILY 120 tablet 2    empagliflozin (JARDIANCE) 25 MG tablet Take 1 tablet by mouth once daily 90 tablet 3    HUMALOG KWIKPEN 100 UNIT/ML SOPN USE PER SLIDING SCALE,  MAXIMUM  UNITS PER   mL 11    Multiple Vitamins-Minerals (MULTIVITAMIN ADULTS PO) Take by mouth      insulin glargine (LANTUS SOLOSTAR) 100 UNIT/ML injection pen Inject 45 Units into the skin 2 times daily 45 pen 1    insulin lispro, 1 Unit Dial, (HUMALOG KWIKPEN) 100 UNIT/ML SOPN Dose per sliding scale. Max dose 100 units daily. (Patient not taking: Reported on 5/5/2021) 30 pen 3    acetaminophen (TYLENOL) 500 MG tablet Take 1,000 mg by mouth every 6 hours as needed for Pain       No current facility-administered medications for this visit.

## 2021-07-07 DIAGNOSIS — Z79.4 TYPE 2 DIABETES MELLITUS WITHOUT COMPLICATION, WITH LONG-TERM CURRENT USE OF INSULIN (HCC): ICD-10-CM

## 2021-07-07 DIAGNOSIS — E11.9 TYPE 2 DIABETES MELLITUS WITHOUT COMPLICATION, WITH LONG-TERM CURRENT USE OF INSULIN (HCC): ICD-10-CM

## 2021-07-07 RX ORDER — INSULIN LISPRO 100 [IU]/ML
INJECTION, SOLUTION INTRAVENOUS; SUBCUTANEOUS
Qty: 120 ML | Refills: 11 | Status: SHIPPED | OUTPATIENT
Start: 2021-07-07

## 2021-08-16 DIAGNOSIS — Z79.4 TYPE 2 DIABETES MELLITUS WITHOUT COMPLICATION, WITH LONG-TERM CURRENT USE OF INSULIN (HCC): ICD-10-CM

## 2021-08-16 DIAGNOSIS — E11.9 TYPE 2 DIABETES MELLITUS WITHOUT COMPLICATION, WITH LONG-TERM CURRENT USE OF INSULIN (HCC): ICD-10-CM

## 2021-08-16 RX ORDER — EMPAGLIFLOZIN 25 MG/1
TABLET, FILM COATED ORAL
Qty: 90 TABLET | Refills: 3 | Status: SHIPPED | OUTPATIENT
Start: 2021-08-16 | End: 2022-07-25

## 2021-08-16 NOTE — TELEPHONE ENCOUNTER
Requested Prescriptions     Pending Prescriptions Disp Refills    empagliflozin (JARDIANCE) 25 MG tablet 90 tablet 3     Sig: Take 1 tablet by mouth once daily

## 2021-09-23 ENCOUNTER — TELEPHONE (OUTPATIENT)
Dept: FAMILY MEDICINE CLINIC | Age: 51
End: 2021-09-23

## 2021-09-23 NOTE — TELEPHONE ENCOUNTER
No they do not prevent him from getting the vaccines. With his chronic conditions I would recommend the vaccine due to being high risk for ZXCPE-00 complications.

## 2021-09-23 NOTE — TELEPHONE ENCOUNTER
Patient works at [employer] they are mandating employees to get a COVID vaccine. Patient would like a call back with advice. Does his medical conditions prevent him from getting the vaccine? Patient asking for Dr. Hermina Paget to review. 21-May-2020 22:27

## 2021-10-08 DIAGNOSIS — E11.9 TYPE 2 DIABETES MELLITUS WITHOUT COMPLICATION, WITH LONG-TERM CURRENT USE OF INSULIN (HCC): ICD-10-CM

## 2021-10-08 DIAGNOSIS — Z79.4 TYPE 2 DIABETES MELLITUS WITHOUT COMPLICATION, WITH LONG-TERM CURRENT USE OF INSULIN (HCC): ICD-10-CM

## 2021-10-08 RX ORDER — INSULIN GLARGINE 100 [IU]/ML
45 INJECTION, SOLUTION SUBCUTANEOUS 2 TIMES DAILY
Qty: 45 PEN | Refills: 1 | Status: SHIPPED | OUTPATIENT
Start: 2021-10-08 | End: 2022-01-11

## 2021-10-08 NOTE — TELEPHONE ENCOUNTER
Requested Prescriptions     Pending Prescriptions Disp Refills    insulin glargine (LANTUS SOLOSTAR) 100 UNIT/ML injection pen 45 pen 1     Sig: Inject 45 Units into the skin 2 times daily

## 2021-10-12 DIAGNOSIS — E78.2 MIXED HYPERLIPIDEMIA: ICD-10-CM

## 2021-10-12 RX ORDER — FENOFIBRATE 145 MG/1
145 TABLET, COATED ORAL DAILY
Qty: 90 TABLET | Refills: 3 | Status: SHIPPED | OUTPATIENT
Start: 2021-10-12 | End: 2022-09-16

## 2021-10-12 NOTE — TELEPHONE ENCOUNTER
Attempted to contact patient, LVM. Notified patient via my chart message. No return call from patient.

## 2021-11-10 LAB
ANION GAP SERPL CALCULATED.3IONS-SCNC: 9 MMOL/L (ref 5–15)
AVERAGE GLUCOSE: 269 MG/DL
BUN BLDV-MCNC: 39 MG/DL (ref 5–23)
CALCIUM SERPL-MCNC: 10.2 MG/DL (ref 8.5–10.5)
CHLORIDE BLD-SCNC: 100 MMOL/L (ref 98–109)
CHOLESTEROL/HDL RATIO: 7.1 (ref 1–5)
CHOLESTEROL: 290 MG/DL (ref 150–200)
CO2: 27 MMOL/L (ref 22–32)
CREAT SERPL-MCNC: 1.49 MG/DL (ref 0.6–1.3)
EGFR AFRICAN AMERICAN: >60 ML/MIN/1.73SQ.M
EGFR IF NONAFRICAN AMERICAN: 50 ML/MIN/1.73SQ.M
GLUCOSE: 229 MG/DL (ref 65–99)
HBA1C MFR BLD: 11 % (ref 4.4–6.4)
HDLC SERPL-MCNC: 41 MG/DL
LDL CHOLESTEROL CALCULATED: ABNORMAL MG/DL
LDL CHOLESTEROL DIRECT: 147 MG/DL
LDL/HDL RATIO: ABNORMAL
POTASSIUM SERPL-SCNC: 4.5 MMOL/L (ref 3.5–5)
SODIUM BLD-SCNC: 136 MMOL/L (ref 134–146)
TRIGL SERPL-MCNC: 685 MG/DL (ref 27–150)
VLDLC SERPL CALC-MCNC: 137 MG/DL (ref 0–30)

## 2021-11-18 ENCOUNTER — OFFICE VISIT (OUTPATIENT)
Dept: FAMILY MEDICINE CLINIC | Age: 51
End: 2021-11-18
Payer: COMMERCIAL

## 2021-11-18 VITALS
DIASTOLIC BLOOD PRESSURE: 84 MMHG | RESPIRATION RATE: 20 BRPM | WEIGHT: 257.8 LBS | SYSTOLIC BLOOD PRESSURE: 144 MMHG | HEART RATE: 92 BPM | BODY MASS INDEX: 34.96 KG/M2

## 2021-11-18 DIAGNOSIS — N18.31 STAGE 3A CHRONIC KIDNEY DISEASE (HCC): ICD-10-CM

## 2021-11-18 DIAGNOSIS — Z79.4 TYPE 2 DIABETES MELLITUS WITH HYPERGLYCEMIA, WITH LONG-TERM CURRENT USE OF INSULIN (HCC): Primary | ICD-10-CM

## 2021-11-18 DIAGNOSIS — I10 ESSENTIAL HYPERTENSION: ICD-10-CM

## 2021-11-18 DIAGNOSIS — G47.26 RECURRENT CIRCADIAN RHYTHM SLEEP DISORDER, SHIFT WORK TYPE: ICD-10-CM

## 2021-11-18 DIAGNOSIS — E78.2 MIXED HYPERLIPIDEMIA: ICD-10-CM

## 2021-11-18 DIAGNOSIS — F34.1 DYSTHYMIA: ICD-10-CM

## 2021-11-18 DIAGNOSIS — E66.01 CLASS 2 SEVERE OBESITY DUE TO EXCESS CALORIES WITH SERIOUS COMORBIDITY AND BODY MASS INDEX (BMI) OF 37.0 TO 37.9 IN ADULT (HCC): ICD-10-CM

## 2021-11-18 DIAGNOSIS — E11.65 TYPE 2 DIABETES MELLITUS WITH HYPERGLYCEMIA, WITH LONG-TERM CURRENT USE OF INSULIN (HCC): Primary | ICD-10-CM

## 2021-11-18 PROCEDURE — G8417 CALC BMI ABV UP PARAM F/U: HCPCS | Performed by: NURSE PRACTITIONER

## 2021-11-18 PROCEDURE — 99214 OFFICE O/P EST MOD 30 MIN: CPT | Performed by: NURSE PRACTITIONER

## 2021-11-18 PROCEDURE — 3017F COLORECTAL CA SCREEN DOC REV: CPT | Performed by: NURSE PRACTITIONER

## 2021-11-18 PROCEDURE — G8484 FLU IMMUNIZE NO ADMIN: HCPCS | Performed by: NURSE PRACTITIONER

## 2021-11-18 PROCEDURE — G8427 DOCREV CUR MEDS BY ELIG CLIN: HCPCS | Performed by: NURSE PRACTITIONER

## 2021-11-18 PROCEDURE — 4004F PT TOBACCO SCREEN RCVD TLK: CPT | Performed by: NURSE PRACTITIONER

## 2021-11-18 PROCEDURE — 3046F HEMOGLOBIN A1C LEVEL >9.0%: CPT | Performed by: NURSE PRACTITIONER

## 2021-11-18 PROCEDURE — 2022F DILAT RTA XM EVC RTNOPTHY: CPT | Performed by: NURSE PRACTITIONER

## 2021-11-18 RX ORDER — VIT C/B6/B5/MAGNESIUM/HERB 173 50-5-6-5MG
500 CAPSULE ORAL DAILY
COMMUNITY

## 2021-11-18 RX ORDER — ATORVASTATIN CALCIUM 80 MG/1
80 TABLET, FILM COATED ORAL DAILY
Qty: 90 TABLET | Refills: 3 | Status: SHIPPED | OUTPATIENT
Start: 2021-11-18

## 2021-11-18 RX ORDER — ASPIRIN 81 MG/1
81 TABLET ORAL DAILY
Qty: 90 TABLET | Refills: 3 | Status: SHIPPED | OUTPATIENT
Start: 2021-11-18

## 2021-11-18 RX ORDER — PIOGLITAZONEHYDROCHLORIDE 45 MG/1
1 TABLET ORAL DAILY
COMMUNITY
Start: 2021-08-26 | End: 2022-03-11

## 2021-11-18 RX ORDER — VALSARTAN 160 MG/1
160 TABLET ORAL DAILY
Qty: 90 TABLET | Refills: 3 | Status: SHIPPED | OUTPATIENT
Start: 2021-11-18

## 2021-11-18 SDOH — ECONOMIC STABILITY: FOOD INSECURITY: WITHIN THE PAST 12 MONTHS, YOU WORRIED THAT YOUR FOOD WOULD RUN OUT BEFORE YOU GOT MONEY TO BUY MORE.: NEVER TRUE

## 2021-11-18 SDOH — ECONOMIC STABILITY: FOOD INSECURITY: WITHIN THE PAST 12 MONTHS, THE FOOD YOU BOUGHT JUST DIDN'T LAST AND YOU DIDN'T HAVE MONEY TO GET MORE.: NEVER TRUE

## 2021-11-18 ASSESSMENT — SOCIAL DETERMINANTS OF HEALTH (SDOH): HOW HARD IS IT FOR YOU TO PAY FOR THE VERY BASICS LIKE FOOD, HOUSING, MEDICAL CARE, AND HEATING?: NOT HARD AT ALL

## 2021-11-18 ASSESSMENT — ENCOUNTER SYMPTOMS
SHORTNESS OF BREATH: 0
COUGH: 0
NAUSEA: 0
ABDOMINAL PAIN: 0

## 2021-11-18 NOTE — PROGRESS NOTES
Component Value Date/Time     11/10/2021 0957    K 4.5 11/10/2021 0957     11/10/2021 0957    CO2 27 11/10/2021 0957    BUN 39 (H) 11/10/2021 0957    CREATININE 1.49 (H) 11/10/2021 0957        Component Value Date/Time    CALCIUM 10.2 11/10/2021 0957    ALKPHOS 70 01/04/2021 1130    ALKPHOS 56 09/30/2017 0930    AST 16 01/04/2021 1130    ALT 21 01/04/2021 1130    BILITOT 0.4 01/04/2021 1130            No results found for: TSH, F9ZGOZW, D4MEABF, THYROIDAB    Lab Results   Component Value Date    WBC 8.7 01/04/2021    HGB 16.5 01/04/2021    HCT 48.8 01/04/2021    MCV 86 01/04/2021     01/04/2021         Health Maintenance   Topic Date Due    Hepatitis C screen  Never done    Pneumococcal 0-64 years Vaccine (1 of 2 - PPSV23) Never done    COVID-19 Vaccine (1) Never done    Hepatitis B vaccine (1 of 3 - Risk 3-dose series) Never done    DTaP/Tdap/Td vaccine (1 - Tdap) Never done    Colon cancer screen colonoscopy  Never done    Shingles Vaccine (1 of 2) Never done    Diabetic retinal exam  05/20/2021    Flu vaccine (1) Never done    Diabetic microalbuminuria test  01/04/2022    Diabetic foot exam  01/07/2022    A1C test (Diabetic or Prediabetic)  02/10/2022    Lipid screen  11/10/2022    Potassium monitoring  11/10/2022    Creatinine monitoring  11/10/2022    HIV screen  Addressed    Hepatitis A vaccine  Aged Out    Hib vaccine  Aged Out    Meningococcal (ACWY) vaccine  Aged Out         There is no immunization history on file for this patient. Review of Systems   Constitutional: Negative for chills and fever. HENT: Negative. Respiratory: Negative for cough and shortness of breath. Cardiovascular: Negative for chest pain. Gastrointestinal: Negative for abdominal pain and nausea. Genitourinary: Positive for frequency. Skin: Negative for rash. Neurological: Negative for dizziness, light-headedness and headaches. Psychiatric/Behavioral: Negative. Objective:   Physical Exam  Constitutional:       General: He is not in acute distress. Eyes:      Pupils: Pupils are equal, round, and reactive to light. Cardiovascular:      Rate and Rhythm: Normal rate and regular rhythm. Heart sounds: No murmur heard. Pulmonary:      Effort: Pulmonary effort is normal.      Breath sounds: Normal breath sounds. No wheezing. Abdominal:      General: Bowel sounds are normal. There is no distension. Palpations: Abdomen is soft. Tenderness: There is no abdominal tenderness. Musculoskeletal:         General: No tenderness. Normal range of motion. Cervical back: Normal range of motion and neck supple. Skin:     General: Skin is warm and dry. Findings: No rash. Assessment:       Diagnosis Orders   1. Type 2 diabetes mellitus with hyperglycemia, with long-term current use of insulin ([facility])  valsartan (DIOVAN) 160 MG tablet    aspirin 81 MG EC tablet    Hemoglobin A1C   2. Essential hypertension  valsartan (DIOVAN) 160 MG tablet   3. Mixed hyperlipidemia  atorvastatin (LIPITOR) 80 MG tablet    Lipid Panel   4. Stage 3a chronic kidney disease (HCC)  CBC    Comprehensive Metabolic Panel   5. Class 2 severe obesity due to excess calories with serious comorbidity and body mass index (BMI) of 37.0 to 37.9 in adult ([facility] [address]     6. Dysthymia     7.  Recurrent circadian rhythm sleep disorder, shift work type             Plan:      Chronic conditions stable except DM   - continues to admits to poor diet, lack of exercise, use of insulin  Risk of worsening kidney function stress along with other health risks  Refills as above   - resent STATIN  Denies GLP-1 for DM management  Diet, exercise and weight loss stress  CRS options discussed  Follow up with Specialists  Immunizations discussed  Labs in 4 months  RTO in 4 months           Current Outpatient Medications   Medication Sig Dispense Refill    pioglitazone (ACTOS) 45 MG tablet Take 1 tablet by mouth daily      valsartan (DIOVAN) 160 MG tablet Take 1 tablet by mouth daily 90 tablet 3    aspirin 81 MG EC tablet Take 1 tablet by mouth daily 90 tablet 3    B Complex-C (SUPER B COMPLEX PO) Take 1 tablet by mouth daily      turmeric 500 MG CAPS Take 500 mg by mouth daily      atorvastatin (LIPITOR) 80 MG tablet Take 1 tablet by mouth daily 90 tablet 3    fenofibrate (TRICOR) 145 MG tablet Take 1 tablet by mouth daily 90 tablet 3    insulin glargine (LANTUS SOLOSTAR) 100 UNIT/ML injection pen Inject 45 Units into the skin 2 times daily 45 pen 1    empagliflozin (JARDIANCE) 25 MG tablet Take 1 tablet by mouth once daily 90 tablet 3    HUMALOG KWIKPEN 100 UNIT/ML SOPN USE PER SLIDING SCALE,  MAXIMUM  UNITS PER   mL 11    sertraline (ZOLOFT) 50 MG tablet Take 1 tablet by mouth daily 90 tablet 3    Insulin Pen Needle (B-D ULTRAFINE III SHORT PEN) 31G X 8 MM MISC Use as directed for insulin 200 each 3    acetaminophen (TYLENOL) 500 MG tablet Take 1,000 mg by mouth every 6 hours as needed for Pain       No current facility-administered medications for this visit.

## 2021-11-18 NOTE — PATIENT INSTRUCTIONS
You may receive a survey regarding the care you received during your visit. Your input is valuable to us. We encourage you to complete and return your survey. We hope you will choose us in the future for your healthcare needs. Tobacco Cessation Programs     Telephonic behavior modification  Keegan Haynes ([phone])   Counseling service for those who are ready to quit using tobacco.     Available for uninsured [place] residents, [facility] recipients, pregnant women, or patients whose health plans or employers are members of the [address] [place] [address] behavior modification   http://[place]. Quitlogix. org   Online support program to help patients through each step of the quitting process. Available 24 hours a day 7 days a week. Provides up to date researched based tool, step-by-step guides, and motivational messages. Online behavior modification   www.lungusa.org/stop-smoking/how-to-quit   HelpLine: [facility] [facility] (161-7380)   Email questions to:  [email or web address] [email or web address]    Website offers resources to help tobacco users figure out their reasons for quitting and then take the big step of quitting for good. Hypnosis   Location: [address] [facility] [place], [place]   Contact: Tanvi Emerson, PhD at [phone]   Hypnosis for tobacco cessation   Cost $225 for the initial session and $175 for each session afterwards. Most patients require 6-8 sessions. There is the option to submit through the patients insurance. Hypnosis and behavior modification   Location: [facility],  [address] [place], [place]   Contact: Aurora Bird, PhD at [phone]  [facility] Counsel[facility] and hypnosis for nicotine addition   Cost: For uninsured patients:  Please call above phone number  Cost for insured patients depends on patients insurance plan.     Behavior modification   Location: [facility], [address] [facility] [address] [place], [address]: [address] four one-on-one appointments between the patient and a respiratory therapist.  The four appointments span over three weeks. The respiratory therapist schedules one of the appointments to occur 48 hours after the patients quit date.  Cost $100 total for the four sessions.   Tobacco cessation products are not included in the cost and are not provided by [facility].     Jeanne Aceves

## 2022-01-10 DIAGNOSIS — E11.9 TYPE 2 DIABETES MELLITUS WITHOUT COMPLICATION, WITH LONG-TERM CURRENT USE OF INSULIN (HCC): ICD-10-CM

## 2022-01-10 DIAGNOSIS — Z79.4 TYPE 2 DIABETES MELLITUS WITHOUT COMPLICATION, WITH LONG-TERM CURRENT USE OF INSULIN (HCC): ICD-10-CM

## 2022-01-11 RX ORDER — INSULIN GLARGINE 100 [IU]/ML
INJECTION, SOLUTION SUBCUTANEOUS
Qty: 90 ML | Refills: 2 | Status: SHIPPED | OUTPATIENT
Start: 2022-01-11 | End: 2022-10-10

## 2022-03-11 RX ORDER — PIOGLITAZONEHYDROCHLORIDE 45 MG/1
TABLET ORAL DAILY
Qty: 120 TABLET | Refills: 2 | Status: SHIPPED | OUTPATIENT
Start: 2022-03-11

## 2022-03-21 DIAGNOSIS — F34.1 DYSTHYMIA: ICD-10-CM

## 2022-03-22 LAB
ABSOLUTE BASO #: 0.1 X10E9/L (ref 0–0.2)
ABSOLUTE EOS #: 0.2 X10E9/L (ref 0–0.4)
ABSOLUTE LYMPH #: 2.1 X10E9/L (ref 1–3.5)
ABSOLUTE MONO #: 0.5 X10E9/L (ref 0–0.9)
ABSOLUTE NEUT #: 6.5 X10E9/L (ref 1.5–6.6)
ALBUMIN SERPL-MCNC: 4.5 G/DL (ref 3.2–5.3)
ALK PHOSPHATASE: 62 U/L (ref 39–130)
ALT SERPL-CCNC: 22 U/L (ref 0–40)
ANION GAP SERPL CALCULATED.3IONS-SCNC: 12 MMOL/L (ref 5–15)
AST SERPL-CCNC: 17 U/L (ref 0–41)
AVERAGE GLUCOSE: 303 MG/DL
BASOPHILS RELATIVE PERCENT: 1.1 %
BILIRUB SERPL-MCNC: 0.5 MG/DL (ref 0.3–1.2)
BUN BLDV-MCNC: 30 MG/DL (ref 5–23)
CALCIUM SERPL-MCNC: 10.1 MG/DL (ref 8.5–10.5)
CHLORIDE BLD-SCNC: 101 MMOL/L (ref 98–109)
CHOLESTEROL/HDL RATIO: 6.7 (ref 1–5)
CHOLESTEROL: 263 MG/DL (ref 150–200)
CO2: 24 MMOL/L (ref 22–32)
CREAT SERPL-MCNC: 1.4 MG/DL (ref 0.6–1.3)
EGFR AFRICAN AMERICAN: >60 ML/MIN/1.73SQ.M
EGFR IF NONAFRICAN AMERICAN: 53 ML/MIN/1.73SQ.M
EOSINOPHILS RELATIVE PERCENT: 1.8 %
GLUCOSE: 220 MG/DL (ref 65–99)
HBA1C MFR BLD: 12.2 % (ref 4.4–6.4)
HCT VFR BLD CALC: 48.2 % (ref 39–49)
HDLC SERPL-MCNC: 39 MG/DL
HEMOGLOBIN: 15.9 G/DL (ref 13–17)
LDL CHOLESTEROL CALCULATED: ABNORMAL MG/DL
LDL CHOLESTEROL DIRECT: 152 MG/DL
LDL/HDL RATIO: ABNORMAL
LYMPHOCYTE %: 22.6 %
MCH RBC QN AUTO: 28.6 PG (ref 27–34)
MCHC RBC AUTO-ENTMCNC: 32.9 G/DL (ref 32–36)
MCV RBC AUTO: 87 FL (ref 80–100)
MONOCYTES # BLD: 5.2 %
NEUTROPHILS RELATIVE PERCENT: 69.3 %
PDW BLD-RTO: 13.7 % (ref 11.5–15)
PLATELETS: 293 X10E9/L (ref 150–450)
PMV BLD AUTO: 8.4 FL (ref 7–12)
POTASSIUM SERPL-SCNC: 4.2 MMOL/L (ref 3.5–5)
RBC: 5.55 X10E12/L (ref 4.1–5.7)
SODIUM BLD-SCNC: 137 MMOL/L (ref 134–146)
TOTAL PROTEIN: 7.7 G/DL (ref 6–8)
TRIGL SERPL-MCNC: 537 MG/DL (ref 27–150)
VLDLC SERPL CALC-MCNC: 107 MG/DL (ref 0–30)
WBC: 9.4 X10E9/L (ref 4–11)

## 2022-03-25 ENCOUNTER — OFFICE VISIT (OUTPATIENT)
Dept: FAMILY MEDICINE CLINIC | Age: 52
End: 2022-03-25
Payer: COMMERCIAL

## 2022-03-25 VITALS
DIASTOLIC BLOOD PRESSURE: 80 MMHG | HEIGHT: 72 IN | SYSTOLIC BLOOD PRESSURE: 128 MMHG | RESPIRATION RATE: 16 BRPM | WEIGHT: 258.4 LBS | BODY MASS INDEX: 35 KG/M2 | HEART RATE: 76 BPM

## 2022-03-25 DIAGNOSIS — Z00.00 WELL ADULT EXAM: Primary | ICD-10-CM

## 2022-03-25 DIAGNOSIS — E66.01 CLASS 2 SEVERE OBESITY DUE TO EXCESS CALORIES WITH SERIOUS COMORBIDITY AND BODY MASS INDEX (BMI) OF 37.0 TO 37.9 IN ADULT (HCC): ICD-10-CM

## 2022-03-25 DIAGNOSIS — I10 ESSENTIAL HYPERTENSION: ICD-10-CM

## 2022-03-25 DIAGNOSIS — N18.31 STAGE 3A CHRONIC KIDNEY DISEASE (HCC): ICD-10-CM

## 2022-03-25 DIAGNOSIS — F34.1 DYSTHYMIA: ICD-10-CM

## 2022-03-25 DIAGNOSIS — E78.2 MIXED HYPERLIPIDEMIA: ICD-10-CM

## 2022-03-25 DIAGNOSIS — Z79.4 TYPE 2 DIABETES MELLITUS WITH HYPERGLYCEMIA, WITH LONG-TERM CURRENT USE OF INSULIN (HCC): ICD-10-CM

## 2022-03-25 DIAGNOSIS — E11.65 TYPE 2 DIABETES MELLITUS WITH HYPERGLYCEMIA, WITH LONG-TERM CURRENT USE OF INSULIN (HCC): ICD-10-CM

## 2022-03-25 PROCEDURE — 99396 PREV VISIT EST AGE 40-64: CPT | Performed by: NURSE PRACTITIONER

## 2022-03-25 PROCEDURE — G8484 FLU IMMUNIZE NO ADMIN: HCPCS | Performed by: NURSE PRACTITIONER

## 2022-03-25 PROCEDURE — 3046F HEMOGLOBIN A1C LEVEL >9.0%: CPT | Performed by: NURSE PRACTITIONER

## 2022-03-25 ASSESSMENT — PATIENT HEALTH QUESTIONNAIRE - PHQ9
3. TROUBLE FALLING OR STAYING ASLEEP: 0
5. POOR APPETITE OR OVEREATING: 0
SUM OF ALL RESPONSES TO PHQ9 QUESTIONS 1 & 2: 0
7. TROUBLE CONCENTRATING ON THINGS, SUCH AS READING THE NEWSPAPER OR WATCHING TELEVISION: 0
SUM OF ALL RESPONSES TO PHQ QUESTIONS 1-9: 0
4. FEELING TIRED OR HAVING LITTLE ENERGY: 0
SUM OF ALL RESPONSES TO PHQ QUESTIONS 1-9: 0
SUM OF ALL RESPONSES TO PHQ QUESTIONS 1-9: 0
9. THOUGHTS THAT YOU WOULD BE BETTER OFF DEAD, OR OF HURTING YOURSELF: 0
2. FEELING DOWN, DEPRESSED OR HOPELESS: 0
SUM OF ALL RESPONSES TO PHQ QUESTIONS 1-9: 0
1. LITTLE INTEREST OR PLEASURE IN DOING THINGS: 0
6. FEELING BAD ABOUT YOURSELF - OR THAT YOU ARE A FAILURE OR HAVE LET YOURSELF OR YOUR FAMILY DOWN: 0
10. IF YOU CHECKED OFF ANY PROBLEMS, HOW DIFFICULT HAVE THESE PROBLEMS MADE IT FOR YOU TO DO YOUR WORK, TAKE CARE OF THINGS AT HOME, OR GET ALONG WITH OTHER PEOPLE: 0
8. MOVING OR SPEAKING SO SLOWLY THAT OTHER PEOPLE COULD HAVE NOTICED. OR THE OPPOSITE, BEING SO FIGETY OR RESTLESS THAT YOU HAVE BEEN MOVING AROUND A LOT MORE THAN USUAL: 0

## 2022-03-25 ASSESSMENT — ENCOUNTER SYMPTOMS
ABDOMINAL PAIN: 0
COUGH: 0
NAUSEA: 0
SHORTNESS OF BREATH: 0

## 2022-03-25 NOTE — PROGRESS NOTES
Subjective:      Patient ID: Ame Gonzales 1970 is a 46 y.o. male here for evaluation. Chief Complaint   Patient presents with    Follow-up    Diabetes    Results       Patient Active Problem List   Diagnosis    DM (diabetes mellitus) ([address]    HTN (hypertension)    Hyperlipidemia    Medication monitoring encounter    Callus of foot, bilateral    Hypercalcemia    Allergic rhinitis       COLONOSCOPY - due    RENAL - Dr. Christelle Barrera    Denies CP, SOB or chest tightness    On Diovan 160 mg.     BP Readings from Last 3 Encounters:   03/25/22 128/80   11/18/21 (!) 144/84   05/05/21 138/80     Wt Readings from Last 3 Encounters:   03/25/22 258 lb 6.4 oz (117.2 kg)   11/18/21 257 lb 12.8 oz (116.9 kg)   05/05/21 258 lb 12.8 oz (117.4 kg)       On Jardiance 25 mg, Actos 30 mg, Lantus 45 units BID and Humalog SS. Will be getting off swing shift and onto a more regular schedule. Will allow him to be better on diet and compliance with his insulin. No regular eating schedule. Lab Results   Component Value Date    LABA1C 12.2 (H) 03/21/2022    LABA1C 11.0 (H) 11/10/2021    LABA1C 11.6 (H) 01/04/2021     No results found for: EAG    On Fenofibrate 160 mg and Simvastatin 40 mg Daily. States compliant with medication. Did not start Atorvastatin due to elevated TRIG and cholesterol.      No components found for: CHLPL  Lab Results   Component Value Date    TRIG 537 (H) 03/21/2022    TRIG 685 (H) 11/10/2021    TRIG 1,386 (H) 01/04/2021     Lab Results   Component Value Date    HDL 39 (L) 03/21/2022    HDL 41 11/10/2021    HDL 31 (L) 01/04/2021     Lab Results   Component Value Date    LDLCALC See Note 03/21/2022    LDLCALC See Note 11/10/2021    [address] See Note 01/04/2021     Lab Results   Component Value Date    LABVLDL 107 (H) 03/21/2022    LABVLDL 137 (H) 11/10/2021    LABVLDL 277 (H) 01/04/2021         Chemistry        Component Value Date/Time     03/21/2022 1130    K 4.2 03/21/2022 1130     03/21/2022 1130    CO2 24 03/21/2022 1130    BUN 30 (H) 03/21/2022 1130    CREATININE 1.40 (H) 03/21/2022 1130        Component Value Date/Time    CALCIUM 10.1 03/21/2022 1130    ALKPHOS 62 03/21/2022 1130    ALKPHOS 56 09/30/2017 0930    AST 17 03/21/2022 1130    ALT 22 03/21/2022 1130    BILITOT 0.5 03/21/2022 1130            No results found for: TSH, Y4IFFQM, M8ACTDZ, THYROIDAB    Lab Results   Component Value Date    WBC 9.4 03/21/2022    HGB 15.9 03/21/2022    HCT 48.2 03/21/2022    MCV 87 03/21/2022     03/21/2022         Health Maintenance   Topic Date Due    Hepatitis C screen  Never done    COVID-19 Vaccine (1) Never done    Pneumococcal 0-64 years Vaccine (1 of 2 - PPSV23) Never done    Depression Monitoring  Never done    DTaP/Tdap/Td vaccine (1 - Tdap) Never done    Colorectal Cancer Screen  Never done    Hepatitis B vaccine (2 of 3 - Risk 3-dose series) 03/26/2018    Shingles Vaccine (1 of 2) Never done    Diabetic retinal exam  05/20/2021    Flu vaccine (1) Never done    Diabetic microalbuminuria test  01/04/2022    A1C test (Diabetic or Prediabetic)  06/21/2022    Lipid screen  03/21/2023    Potassium monitoring  03/21/2023    Creatinine monitoring  03/21/2023    Diabetic foot exam  03/25/2023    HIV screen  Addressed    Hepatitis A vaccine  Aged Out    Hib vaccine  Aged Out    Meningococcal (ACWY) vaccine  Aged [employer] History   Administered Date(s) Administered    Hepatitis B 02/26/2018       Review of Systems   Constitutional: Negative for chills and fever. HENT: Negative. Respiratory: Negative for cough and shortness of breath. Cardiovascular: Negative for chest pain. Gastrointestinal: Negative for abdominal pain and nausea. Genitourinary: Positive for frequency. Skin: Negative for rash. Neurological: Negative for dizziness, light-headedness and headaches. Psychiatric/Behavioral: Negative.         Objective:   Physical Exam  Constitutional:       General: He is not in acute distress. Eyes:      Pupils: Pupils are equal, round, and reactive to light. Cardiovascular:      Rate and Rhythm: Normal rate and regular rhythm. Heart sounds: No murmur heard. Pulmonary:      Effort: Pulmonary effort is normal.      Breath sounds: Normal breath sounds. No wheezing. Abdominal:      General: Bowel sounds are normal. There is no distension. Palpations: Abdomen is soft. Tenderness: There is no abdominal tenderness. Musculoskeletal:         General: No tenderness. Normal range of motion. Cervical back: Normal range of motion and neck supple. Skin:     General: Skin is warm and dry. Findings: No rash. Assessment:       Diagnosis Orders   1. Well adult exam     2. Type 2 diabetes mellitus with hyperglycemia, with long-term current use of insulin (HCC)  HM DIABETES FOOT EXAM    Hemoglobin T4Z    Basic Metabolic Panel   3. Essential hypertension     4. Mixed hyperlipidemia  Lipid Panel   5. Stage 3a chronic kidney disease (HCC)     6. Class 2 severe obesity due to excess calories with serious comorbidity and body mass index (BMI) of 37.0 to 37.9 in adult ([facility] [address]     7.  Dysthymia             Plan:      Chronic conditions stable except DM   - continues to admits to poor diet, lack of exercise, use of insulin  Risk of worsening kidney function stress along with other health risks  Per patient is getting on 1st shift will allow focus more on health  Refills as above  Denies GLP-1 for DM management  Diet, exercise and weight loss stress  CRS options discussed - colonoscopy card given  Follow up with Specialists  Immunizations discussed  Labs in 4 months  RTO in 4 months           Current Outpatient Medications   Medication Sig Dispense Refill    sertraline (ZOLOFT) 50 MG tablet TAKE 1 TABLET BY MOUTH  DAILY 90 tablet 3    pioglitazone (ACTOS) 45 MG tablet TAKE 1 TABLET BY MOUTH  DAILY 120 tablet 2    LANTUS SOLOSTAR 100 UNIT/ML injection pen INJECT SUBCUTANEOUSLY 45  UNITS TWICE DAILY 90 mL 2    valsartan (DIOVAN) 160 MG tablet Take 1 tablet by mouth daily 90 tablet 3    aspirin 81 MG EC tablet Take 1 tablet by mouth daily 90 tablet 3    B Complex-C (SUPER B COMPLEX PO) Take 1 tablet by mouth daily      turmeric 500 MG CAPS Take 500 mg by mouth daily      atorvastatin (LIPITOR) 80 MG tablet Take 1 tablet by mouth daily 90 tablet 3    fenofibrate (TRICOR) 145 MG tablet Take 1 tablet by mouth daily 90 tablet 3    empagliflozin (JARDIANCE) 25 MG tablet Take 1 tablet by mouth once daily 90 tablet 3    HUMALOG KWIKPEN 100 UNIT/ML SOPN USE PER SLIDING SCALE,  MAXIMUM  UNITS PER   mL 11    Insulin Pen Needle (B-D ULTRAFINE III SHORT PEN) 31G X 8 MM MISC Use as directed for insulin 200 each 3    acetaminophen (TYLENOL) 500 MG tablet Take 1,000 mg by mouth every 6 hours as needed for Pain       No current facility-administered medications for this visit.

## 2022-03-25 NOTE — PATIENT INSTRUCTIONS
You may receive a survey about your visit with us today. The feedback from our patients helps us identify what is working well and where the service to all patients can be enhanced. Thank you! Tobacco Cessation Programs     Telephonic behavior modification  Forrest Prabhakar ([phone])   Counseling service for those who are ready to quit using tobacco.     Available for uninsured [place] residents, [place] recipients, pregnant women, or patients whose health plans or employers are members of the [address] [place] [address] behavior modification   http://[place]. Quitlogix. org   Online support program to help patients through each step of the quitting process. Available 24 hours a day 7 days a week. Provides up to date researched based tool, step-by-step guides, and motivational messages. Online behavior modification   www.lungusa.org/stop-smoking/how-to-quit   HelpLine: 1-800-LUNG-[place] (154-1920)   Email questions to:  Jonna@Ex[employer].in. [employer]    Website offers resources to help tobacco users figure out their reasons for quitting and then take the big step of quitting for good. Hypnosis   Location: [address] [place] [address], [email or web address][email or web address] II.[place], [place]   Contact: Cait Childs, PhD at [phone]   Hypnosis for tobacco cessation   Cost $225 for the initial session and $175 for each session afterwards. Most patients require 6-8 sessions. There is the option to submit through the patients insurance. Hypnosis and behavior modification   Location: [place] [address],  [facility] [address] [place] [email or web address] II.[place], [place]   Contact: Tiffanie Alva, PhD at [phone]  [facility] Counseling and hypnosis for nicotine addition   Cost: For uninsured patients:  Please call above phone number  Cost for insured patients depends on patients insurance plan.     Behavior modification   Location: [facility], [address] [facility] [address] [place], [address]: [address] four one-on-one appointments between the patient and a respiratory therapist.  The four appointments span over three weeks. The respiratory therapist schedules one of the appointments to occur 48 hours after the patients quit date.  Cost $100 total for the four sessions.   Tobacco cessation products are not included in the cost and are not provided by [facility].

## 2022-07-23 DIAGNOSIS — Z79.4 TYPE 2 DIABETES MELLITUS WITHOUT COMPLICATION, WITH LONG-TERM CURRENT USE OF INSULIN (HCC): ICD-10-CM

## 2022-07-23 DIAGNOSIS — E11.9 TYPE 2 DIABETES MELLITUS WITHOUT COMPLICATION, WITH LONG-TERM CURRENT USE OF INSULIN (HCC): ICD-10-CM

## 2022-07-25 RX ORDER — EMPAGLIFLOZIN 25 MG/1
TABLET, FILM COATED ORAL
Qty: 90 TABLET | Refills: 3 | Status: SHIPPED | OUTPATIENT
Start: 2022-07-25

## 2022-07-28 ENCOUNTER — OFFICE VISIT (OUTPATIENT)
Dept: FAMILY MEDICINE CLINIC | Age: 52
End: 2022-07-28
Payer: COMMERCIAL

## 2022-07-28 VITALS
WEIGHT: 266.6 LBS | BODY MASS INDEX: 36.16 KG/M2 | HEART RATE: 101 BPM | OXYGEN SATURATION: 97 % | SYSTOLIC BLOOD PRESSURE: 128 MMHG | RESPIRATION RATE: 16 BRPM | DIASTOLIC BLOOD PRESSURE: 80 MMHG

## 2022-07-28 DIAGNOSIS — E66.01 CLASS 2 SEVERE OBESITY DUE TO EXCESS CALORIES WITH SERIOUS COMORBIDITY AND BODY MASS INDEX (BMI) OF 37.0 TO 37.9 IN ADULT (HCC): ICD-10-CM

## 2022-07-28 DIAGNOSIS — I10 ESSENTIAL HYPERTENSION: ICD-10-CM

## 2022-07-28 DIAGNOSIS — N18.31 STAGE 3A CHRONIC KIDNEY DISEASE (HCC): ICD-10-CM

## 2022-07-28 DIAGNOSIS — E11.9 TYPE 2 DIABETES MELLITUS WITHOUT COMPLICATION, WITH LONG-TERM CURRENT USE OF INSULIN (HCC): Primary | ICD-10-CM

## 2022-07-28 DIAGNOSIS — Z79.4 TYPE 2 DIABETES MELLITUS WITHOUT COMPLICATION, WITH LONG-TERM CURRENT USE OF INSULIN (HCC): Primary | ICD-10-CM

## 2022-07-28 DIAGNOSIS — F34.1 DYSTHYMIA: ICD-10-CM

## 2022-07-28 DIAGNOSIS — E78.2 MIXED HYPERLIPIDEMIA: ICD-10-CM

## 2022-07-28 PROCEDURE — 2022F DILAT RTA XM EVC RTNOPTHY: CPT | Performed by: NURSE PRACTITIONER

## 2022-07-28 PROCEDURE — G8427 DOCREV CUR MEDS BY ELIG CLIN: HCPCS | Performed by: NURSE PRACTITIONER

## 2022-07-28 PROCEDURE — 3046F HEMOGLOBIN A1C LEVEL >9.0%: CPT | Performed by: NURSE PRACTITIONER

## 2022-07-28 PROCEDURE — G8417 CALC BMI ABV UP PARAM F/U: HCPCS | Performed by: NURSE PRACTITIONER

## 2022-07-28 PROCEDURE — 4004F PT TOBACCO SCREEN RCVD TLK: CPT | Performed by: NURSE PRACTITIONER

## 2022-07-28 PROCEDURE — 3017F COLORECTAL CA SCREEN DOC REV: CPT | Performed by: NURSE PRACTITIONER

## 2022-07-28 PROCEDURE — 99214 OFFICE O/P EST MOD 30 MIN: CPT | Performed by: NURSE PRACTITIONER

## 2022-07-28 ASSESSMENT — ENCOUNTER SYMPTOMS
SHORTNESS OF BREATH: 0
ABDOMINAL PAIN: 0
NAUSEA: 0
COUGH: 0

## 2022-07-28 NOTE — PROGRESS NOTES
Subjective:      Patient ID: Corinne Alexander 1970 is a 46 y.o. male here for evaluation. Chief Complaint   Patient presents with    Diabetes     4 month fup - no labs will [facility]        Patient Active Problem List   Diagnosis    DM (diabetes mellitus) ([address]    HTN (hypertension)    Hyperlipidemia    Medication monitoring encounter    Callus of foot, bilateral    Hypercalcemia    Allergic rhinitis       COLONOSCOPY - due    RENAL - Dr. Reg Rivas    Denies CP, SOB or chest tightness    On Diovan 160 mg.     BP Readings from Last 3 Encounters:   07/28/22 128/80   03/25/22 128/80   11/18/21 (!) 144/84     Wt Readings from Last 3 Encounters:   07/28/22 266 lb 9.6 oz (120.9 kg)   03/25/22 258 lb 6.4 oz (117.2 kg)   11/18/21 257 lb 12.8 oz (116.9 kg)       Labs pending as he did not complete prior to appt    On Jardiance 25 mg, Actos 30 mg, Lantus 45 units BID and Humalog SS. Metformin SE intolerance. Now on 1st shift. More regular schedule. More consistent. Lab Results   Component Value Date    LABA1C 12.2 (H) 03/21/2022    LABA1C 11.0 (H) 11/10/2021    LABA1C 11.6 (H) 01/04/2021     No results found for: EAG    On Fenofibrate 160 mg and Simvastatin 40 mg Daily.      No components found for: CHLPL  Lab Results   Component Value Date    TRIG 537 (H) 03/21/2022    TRIG 685 (H) 11/10/2021    TRIG 1,386 (H) 01/04/2021     Lab Results   Component Value Date    HDL 39 (L) 03/21/2022    HDL 41 11/10/2021    HDL 31 (L) 01/04/2021     Lab Results   Component Value Date    LDLCALC See Note 03/21/2022    LDLCALC See Note 11/10/2021    [address] See Note 01/04/2021     Lab Results   Component Value Date    LABVLDL 107 (H) 03/21/2022    LABVLDL 137 (H) 11/10/2021    LABVLDL 277 (H) 01/04/2021         Chemistry        Component Value Date/Time     03/21/2022 1130    K 4.2 03/21/2022 1130     03/21/2022 1130    CO2 24 03/21/2022 1130    BUN 30 (H) 03/21/2022 1130    CREATININE 1.40 (H) 03/21/2022 1130        Component Value Date/Time    CALCIUM 10.1 03/21/2022 1130    ALKPHOS 62 03/21/2022 1130    ALKPHOS 56 09/30/2017 0930    AST 17 03/21/2022 1130    ALT 22 03/21/2022 1130    BILITOT 0.5 03/21/2022 1130            No results found for: TSH, G6TZTLP, O1HNODO, THYROIDAB    Lab Results   Component Value Date    WBC 9.4 03/21/2022    HGB 15.9 03/21/2022    HCT 48.2 03/21/2022    MCV 87 03/21/2022     03/21/2022         Health Maintenance   Topic Date Due    COVID-19 Vaccine (1) Never done    Pneumococcal 0-64 years Vaccine (1 - PCV) Never done    Hepatitis C screen  Never done    DTaP/Tdap/Td vaccine (1 - Tdap) Never done    Colorectal Cancer Screen  Never done    Hepatitis B vaccine (2 of 3 - Risk 3-dose series) 03/26/2018    Shingles vaccine (1 of 2) Never done    Diabetic retinal exam  05/20/2021    Diabetic microalbuminuria test  01/04/2022    A1C test (Diabetic or Prediabetic)  06/21/2022    Flu vaccine (1) 09/01/2022    Lipids  03/21/2023    Diabetic foot exam  03/25/2023    Depression Monitoring  03/25/2023    HIV screen  Addressed    Hepatitis A vaccine  Aged Out    Hib vaccine  Aged Out    Meningococcal (ACWY) vaccine  Aged Out       Immunization History   Administered Date(s) Administered    Hepatitis B 02/26/2018       Review of Systems   Constitutional:  Negative for chills and fever. HENT: Negative. Respiratory:  Negative for cough and shortness of breath. Cardiovascular:  Negative for chest pain. Gastrointestinal:  Negative for abdominal pain and nausea. Genitourinary:  Positive for frequency. Skin:  Negative for rash. Neurological:  Negative for dizziness, light-headedness and headaches. Psychiatric/Behavioral: Negative. Objective:   Physical Exam  Constitutional:       General: He is not in acute distress. Eyes:      Pupils: Pupils are equal, round, and reactive to light. Cardiovascular:      Rate and Rhythm: Normal rate and regular rhythm. Heart sounds: No murmur heard. Pulmonary:      Effort: Pulmonary effort is normal.      Breath sounds: Normal breath sounds. No wheezing. Abdominal:      General: Bowel sounds are normal. There is no distension. Palpations: Abdomen is soft. Tenderness: There is no abdominal tenderness. Musculoskeletal:         General: No tenderness. Normal range of motion. Cervical back: Normal range of motion and neck supple. Skin:     General: Skin is warm and dry. Findings: No rash. Assessment:       Diagnosis Orders   1. Type 2 diabetes mellitus without complication, with long-term current use of insulin ([facility] [address]        2. Essential hypertension        3. Mixed hyperlipidemia        4. Stage 3a chronic kidney disease (HCC)        5. Class 2 severe obesity due to excess calories with serious comorbidity and body mass index (BMI) of 37.0 to 37.9 in adult ([facility] [address]        6.  Dysthymia                Plan:      Chronic conditions stable - has been more consistent  Labs pending  Refills as above  Diet, exercise and weight loss stress  CRS options discussed - colonoscopy card given  Follow up with Specialists  Immunizations discussed  Labs in 4 months  RTO in 4 months           Current Outpatient Medications   Medication Sig Dispense Refill    JARDIANCE 25 MG tablet TAKE 1 TABLET BY MOUTH ONCE DAILY 90 tablet 3    sertraline (ZOLOFT) 50 MG tablet TAKE 1 TABLET BY MOUTH  DAILY 90 tablet 3    pioglitazone (ACTOS) 45 MG tablet TAKE 1 TABLET BY MOUTH  DAILY 120 tablet 2    LANTUS SOLOSTAR 100 UNIT/ML injection pen INJECT SUBCUTANEOUSLY 45  UNITS TWICE DAILY 90 mL 2    valsartan (DIOVAN) 160 MG tablet Take 1 tablet by mouth daily 90 tablet 3    aspirin 81 MG EC tablet Take 1 tablet by mouth daily 90 tablet 3    B Complex-C (SUPER B COMPLEX PO) Take 1 tablet by mouth daily      turmeric 500 MG CAPS Take 500 mg by mouth daily      atorvastatin (LIPITOR) 80 MG tablet Take 1 tablet by mouth daily 90 tablet 3 fenofibrate (TRICOR) 145 MG tablet Take 1 tablet by mouth daily 90 tablet 3    HUMALOG KWIKPEN 100 UNIT/ML SOPN USE PER SLIDING SCALE,  MAXIMUM  UNITS PER   mL 11    Insulin Pen Needle (B-D ULTRAFINE III SHORT PEN) 31G X 8 MM MISC Use as directed for insulin 200 each 3    acetaminophen (TYLENOL) 500 MG tablet Take 1,000 mg by mouth every 6 hours as needed for Pain       No current facility-administered medications for this visit.

## 2022-08-21 LAB
ANION GAP SERPL CALCULATED.3IONS-SCNC: 15 MEQ/L (ref 7–16)
BUN BLDV-MCNC: 32 MG/DL (ref 6–20)
CALCIUM SERPL-MCNC: 9.8 MG/DL (ref 8.5–10.5)
CHLORIDE BLD-SCNC: 100 MEQ/L (ref 95–107)
CHOLESTEROL/HDL RATIO: 7.5 RATIO
CHOLESTEROL: 262 MG/DL
CO2: 22 MEQ/L (ref 19–31)
CREAT SERPL-MCNC: 1.24 MG/DL (ref 0.8–1.4)
EGFR IF NONAFRICAN AMERICAN: 70 ML/MIN/1.73
GLUCOSE: 220 MG/DL (ref 70–99)
HDLC SERPL-MCNC: 35 MG/DL
LDL CHOLESTEROL CALCULATED: ABNORMAL MG/DL
LDL CHOLESTEROL DIRECT: 138 MG/DL
LDL/HDL RATIO: ABNORMAL RATIO
POTASSIUM SERPL-SCNC: 4.4 MEQ/L (ref 3.5–5.4)
SODIUM BLD-SCNC: 137 MEQ/L (ref 133–146)
TRIGL SERPL-MCNC: 502 MG/DL
VLDLC SERPL CALC-MCNC: ABNORMAL MG/DL

## 2022-08-22 LAB
AVERAGE GLUCOSE: 266 MG/DL
HBA1C MFR BLD: 10.9 % (ref 4.2–5.6)

## 2022-09-16 DIAGNOSIS — E78.2 MIXED HYPERLIPIDEMIA: ICD-10-CM

## 2022-09-16 RX ORDER — FENOFIBRATE 145 MG/1
145 TABLET, COATED ORAL DAILY
Qty: 120 TABLET | Refills: 2 | Status: SHIPPED | OUTPATIENT
Start: 2022-09-16

## 2022-10-08 DIAGNOSIS — Z79.4 TYPE 2 DIABETES MELLITUS WITHOUT COMPLICATION, WITH LONG-TERM CURRENT USE OF INSULIN (HCC): ICD-10-CM

## 2022-10-08 DIAGNOSIS — E11.9 TYPE 2 DIABETES MELLITUS WITHOUT COMPLICATION, WITH LONG-TERM CURRENT USE OF INSULIN (HCC): ICD-10-CM

## 2022-10-10 RX ORDER — INSULIN GLARGINE 100 [IU]/ML
INJECTION, SOLUTION SUBCUTANEOUS
Qty: 120 ML | Refills: 2 | Status: SHIPPED | OUTPATIENT
Start: 2022-10-10

## 2022-11-10 DIAGNOSIS — E78.2 MIXED HYPERLIPIDEMIA: ICD-10-CM

## 2022-11-11 RX ORDER — ATORVASTATIN CALCIUM 80 MG/1
80 TABLET, FILM COATED ORAL DAILY
Qty: 90 TABLET | Refills: 3 | Status: SHIPPED | OUTPATIENT
Start: 2022-11-11

## 2022-11-19 DIAGNOSIS — E11.65 TYPE 2 DIABETES MELLITUS WITH HYPERGLYCEMIA, WITH LONG-TERM CURRENT USE OF INSULIN (HCC): ICD-10-CM

## 2022-11-19 DIAGNOSIS — Z79.4 TYPE 2 DIABETES MELLITUS WITH HYPERGLYCEMIA, WITH LONG-TERM CURRENT USE OF INSULIN (HCC): ICD-10-CM

## 2022-11-19 DIAGNOSIS — I10 ESSENTIAL HYPERTENSION: ICD-10-CM

## 2022-11-21 RX ORDER — VALSARTAN 160 MG/1
160 TABLET ORAL DAILY
Qty: 90 TABLET | Refills: 3 | Status: SHIPPED | OUTPATIENT
Start: 2022-11-21

## 2023-01-10 DIAGNOSIS — Z79.4 TYPE 2 DIABETES MELLITUS WITH HYPERGLYCEMIA, WITH LONG-TERM CURRENT USE OF INSULIN (HCC): ICD-10-CM

## 2023-01-10 DIAGNOSIS — E11.65 TYPE 2 DIABETES MELLITUS WITH HYPERGLYCEMIA, WITH LONG-TERM CURRENT USE OF INSULIN (HCC): ICD-10-CM

## 2023-01-10 RX ORDER — ASPIRIN 81 MG/1
TABLET, COATED ORAL
Qty: 90 TABLET | Refills: 3 | Status: SHIPPED | OUTPATIENT
Start: 2023-01-10

## 2023-02-20 RX ORDER — PIOGLITAZONEHYDROCHLORIDE 45 MG/1
TABLET ORAL DAILY
Qty: 90 TABLET | Refills: 3 | Status: SHIPPED | OUTPATIENT
Start: 2023-02-20

## 2023-02-22 DIAGNOSIS — F34.1 DYSTHYMIA: ICD-10-CM

## 2023-05-25 ENCOUNTER — OFFICE VISIT (OUTPATIENT)
Dept: FAMILY MEDICINE CLINIC | Age: 53
End: 2023-05-25
Payer: COMMERCIAL

## 2023-05-25 VITALS
HEIGHT: 72 IN | SYSTOLIC BLOOD PRESSURE: 126 MMHG | WEIGHT: 271.6 LBS | HEART RATE: 72 BPM | RESPIRATION RATE: 18 BRPM | DIASTOLIC BLOOD PRESSURE: 70 MMHG | BODY MASS INDEX: 36.79 KG/M2

## 2023-05-25 DIAGNOSIS — Z79.4 INSULIN DEPENDENT TYPE 2 DIABETES MELLITUS (HCC): ICD-10-CM

## 2023-05-25 DIAGNOSIS — E66.01 CLASS 2 SEVERE OBESITY DUE TO EXCESS CALORIES WITH SERIOUS COMORBIDITY AND BODY MASS INDEX (BMI) OF 37.0 TO 37.9 IN ADULT (HCC): ICD-10-CM

## 2023-05-25 DIAGNOSIS — Z00.00 WELL ADULT EXAM: Primary | ICD-10-CM

## 2023-05-25 DIAGNOSIS — N18.31 STAGE 3A CHRONIC KIDNEY DISEASE (HCC): ICD-10-CM

## 2023-05-25 DIAGNOSIS — I10 ESSENTIAL HYPERTENSION: ICD-10-CM

## 2023-05-25 DIAGNOSIS — E78.2 MIXED HYPERLIPIDEMIA: ICD-10-CM

## 2023-05-25 DIAGNOSIS — E11.9 INSULIN DEPENDENT TYPE 2 DIABETES MELLITUS (HCC): ICD-10-CM

## 2023-05-25 PROCEDURE — 3074F SYST BP LT 130 MM HG: CPT | Performed by: NURSE PRACTITIONER

## 2023-05-25 PROCEDURE — 3078F DIAST BP <80 MM HG: CPT | Performed by: NURSE PRACTITIONER

## 2023-05-25 PROCEDURE — 99396 PREV VISIT EST AGE 40-64: CPT | Performed by: NURSE PRACTITIONER

## 2023-05-25 SDOH — ECONOMIC STABILITY: HOUSING INSECURITY
IN THE LAST 12 MONTHS, WAS THERE A TIME WHEN YOU DID NOT HAVE A STEADY PLACE TO SLEEP OR SLEPT IN A SHELTER (INCLUDING NOW)?: NO

## 2023-05-25 SDOH — ECONOMIC STABILITY: FOOD INSECURITY: WITHIN THE PAST 12 MONTHS, THE FOOD YOU BOUGHT JUST DIDN'T LAST AND YOU DIDN'T HAVE MONEY TO GET MORE.: NEVER TRUE

## 2023-05-25 SDOH — ECONOMIC STABILITY: FOOD INSECURITY: WITHIN THE PAST 12 MONTHS, YOU WORRIED THAT YOUR FOOD WOULD RUN OUT BEFORE YOU GOT MONEY TO BUY MORE.: NEVER TRUE

## 2023-05-25 SDOH — ECONOMIC STABILITY: INCOME INSECURITY: HOW HARD IS IT FOR YOU TO PAY FOR THE VERY BASICS LIKE FOOD, HOUSING, MEDICAL CARE, AND HEATING?: NOT HARD AT ALL

## 2023-05-25 ASSESSMENT — PATIENT HEALTH QUESTIONNAIRE - PHQ9
SUM OF ALL RESPONSES TO PHQ9 QUESTIONS 1 & 2: 0
10. IF YOU CHECKED OFF ANY PROBLEMS, HOW DIFFICULT HAVE THESE PROBLEMS MADE IT FOR YOU TO DO YOUR WORK, TAKE CARE OF THINGS AT HOME, OR GET ALONG WITH OTHER PEOPLE: 0
8. MOVING OR SPEAKING SO SLOWLY THAT OTHER PEOPLE COULD HAVE NOTICED. OR THE OPPOSITE, BEING SO FIGETY OR RESTLESS THAT YOU HAVE BEEN MOVING AROUND A LOT MORE THAN USUAL: 0
SUM OF ALL RESPONSES TO PHQ QUESTIONS 1-9: 0
SUM OF ALL RESPONSES TO PHQ QUESTIONS 1-9: 0
9. THOUGHTS THAT YOU WOULD BE BETTER OFF DEAD, OR OF HURTING YOURSELF: 0
2. FEELING DOWN, DEPRESSED OR HOPELESS: 0
7. TROUBLE CONCENTRATING ON THINGS, SUCH AS READING THE NEWSPAPER OR WATCHING TELEVISION: 0
4. FEELING TIRED OR HAVING LITTLE ENERGY: 0
SUM OF ALL RESPONSES TO PHQ QUESTIONS 1-9: 0
1. LITTLE INTEREST OR PLEASURE IN DOING THINGS: 0
6. FEELING BAD ABOUT YOURSELF - OR THAT YOU ARE A FAILURE OR HAVE LET YOURSELF OR YOUR FAMILY DOWN: 0
SUM OF ALL RESPONSES TO PHQ QUESTIONS 1-9: 0
5. POOR APPETITE OR OVEREATING: 0
3. TROUBLE FALLING OR STAYING ASLEEP: 0

## 2023-05-25 ASSESSMENT — ENCOUNTER SYMPTOMS
ABDOMINAL PAIN: 0
SHORTNESS OF BREATH: 0
NAUSEA: 0
COUGH: 0

## 2023-05-25 NOTE — PATIENT INSTRUCTIONS
You may receive a survey about your visit with us today. The feedback from our patients helps us identify what is working well and where the service to all patients can be enhanced. Thank you! Tobacco Cessation Programs     Telephonic behavior modification  1-800-QUIT-NOW (248-5763)  Counseling service for those who are ready to quit using tobacco.    Available for uninsured [place] residents, Medicaid recipients, pregnant women, or patients whose health plans or employers are members of the [address] [place] [address] behavior modification  http://[place]. [employer]uitlogix. org  Online support program to help patients through each step of the quitting process. Available 24 hours a day 7 days a week. Provides up to date researched based tool, step-by-step guides, and motivational messages. Online behavior modification  www.lungusa.org/stop-smoking/how-to-quit  HelpLine: 1-800-LUNG-[place] (708-4160)  Email questions to:  Sumit@"[employer]. [employer]   Website offers resources to help tobacco users figure out their reasons for quitting and then take the big step of quitting for good. Hypnosis  Location: [address] [place] [address], [place] [employer], [place]  Contact: Phong Cortes, PhD at [phone]  Hypnosis for tobacco cessation  Cost $225 for the initial session and $175 for each session afterwards. Most patients require 6-8 sessions. There is the option to submit through the patients insurance. Hypnosis and behavior modification  Location: [place] [address],  [address] [place] [employer], [place]  Contact: Tuyet Streeter, PhD at [phone]  Counseling and hypnosis for nicotine addition  Cost: For uninsured patients:  Please call above phone number  Cost for insured patients depends on patients insurance plan.     Behavior modification  Location: [facility], [address] [place] [address] [address].Timi ALLEN 50: [phone]  Services include four one-on-one appointments between the patient and a respiratory therapist.  The four appointments

## 2023-05-25 NOTE — PROGRESS NOTES
Subjective:      Patient ID: Lamont uLu 1970 is a 46 y.o. male here for evaluation. Chief Complaint   Patient presents with    Diabetes     Follow up discuss insulin pump        Patient Active Problem List   Diagnosis    DM (diabetes mellitus) ([address]    HTN (hypertension)    Hyperlipidemia    Medication monitoring encounter    Callus of foot, bilateral    Hypercalcemia    Allergic rhinitis       COLONOSCOPY - due    RENAL - Dr. Jona Abraham    Denies CP, SOB or chest tightness    On Diovan 160 mg.     BP Readings from Last 3 Encounters:   05/25/23 126/70   07/28/22 128/80   03/25/22 128/80     Wt Readings from Last 3 Encounters:   05/25/23 271 lb 9.6 oz (123.2 kg)   07/28/22 266 lb 9.6 oz (120.9 kg)   03/25/22 258 lb 6.4 oz (117.2 kg)       To be on Jardiance 25 mg, Actos 30 mg, Lantus 45 units BID and Humalog SS. Metformin SE intolerance. NO consistent with insulin. Due for recheck. Lab Results   Component Value Date    LABA1C 10.9 (H) 08/20/2022    LABA1C 12.2 (H) 03/21/2022    LABA1C 11.0 (H) 11/10/2021     No results found for: EAG    On Fenofibrate 160 mg and Simvastatin 40 mg Daily.      No components found for: CHLPL  Lab Results   Component Value Date    TRIG 502 (H) 08/20/2022    TRIG 537 (H) 03/21/2022    TRIG 685 (H) 11/10/2021     Lab Results   Component Value Date    HDL 35 (L) 08/20/2022    HDL 39 (L) 03/21/2022    HDL 41 11/10/2021     Lab Results   Component Value Date    LDLCALC SEE NOTE 08/20/2022 [address] See Note 03/21/2022 [address] See Note 11/10/2021     Lab Results   Component Value Date    LABVLDL SEE NOTE 08/20/2022    LABVLDL 107 (H) 03/21/2022    LABVLDL 137 (H) 11/10/2021         Chemistry        Component Value Date/Time     08/20/2022 1123    K 4.4 08/20/2022 1123     08/20/2022 1123    CO2 22 08/20/2022 1123    BUN 32 (H) 08/20/2022 1123    CREATININE 1.24 08/20/2022 1123        Component Value Date/Time    CALCIUM 9.8 08/20/2022 1123    ALKPHOS 62

## 2023-06-17 DIAGNOSIS — E11.9 TYPE 2 DIABETES MELLITUS WITHOUT COMPLICATION, WITH LONG-TERM CURRENT USE OF INSULIN (HCC): ICD-10-CM

## 2023-06-17 DIAGNOSIS — Z79.4 TYPE 2 DIABETES MELLITUS WITHOUT COMPLICATION, WITH LONG-TERM CURRENT USE OF INSULIN (HCC): ICD-10-CM

## 2023-06-19 RX ORDER — INSULIN LISPRO 100 [IU]/ML
INJECTION, SOLUTION INTRAVENOUS; SUBCUTANEOUS
Qty: 120 ML | Refills: 2 | Status: SHIPPED | OUTPATIENT
Start: 2023-06-19

## 2023-06-24 DIAGNOSIS — E11.9 TYPE 2 DIABETES MELLITUS WITHOUT COMPLICATION, WITH LONG-TERM CURRENT USE OF INSULIN (HCC): ICD-10-CM

## 2023-06-24 DIAGNOSIS — Z79.4 TYPE 2 DIABETES MELLITUS WITHOUT COMPLICATION, WITH LONG-TERM CURRENT USE OF INSULIN (HCC): ICD-10-CM

## 2023-06-26 RX ORDER — EMPAGLIFLOZIN 25 MG/1
TABLET, FILM COATED ORAL
Qty: 90 TABLET | Refills: 3 | Status: SHIPPED | OUTPATIENT
Start: 2023-06-26

## 2023-07-06 ENCOUNTER — OFFICE VISIT (OUTPATIENT)
Dept: INTERNAL MEDICINE CLINIC | Age: 53
End: 2023-07-06

## 2023-07-06 VITALS
SYSTOLIC BLOOD PRESSURE: 125 MMHG | HEIGHT: 72 IN | WEIGHT: 274.4 LBS | TEMPERATURE: 98.6 F | DIASTOLIC BLOOD PRESSURE: 76 MMHG | BODY MASS INDEX: 37.17 KG/M2 | HEART RATE: 86 BPM

## 2023-07-06 DIAGNOSIS — Z79.4 TYPE 2 DIABETES MELLITUS WITH HYPERGLYCEMIA, WITH LONG-TERM CURRENT USE OF INSULIN (HCC): Primary | ICD-10-CM

## 2023-07-06 DIAGNOSIS — E11.65 TYPE 2 DIABETES MELLITUS WITH HYPERGLYCEMIA, WITH LONG-TERM CURRENT USE OF INSULIN (HCC): Primary | ICD-10-CM

## 2023-07-06 RX ORDER — ASCORBIC ACID 500 MG
500 TABLET ORAL DAILY
COMMUNITY

## 2023-07-06 NOTE — PROGRESS NOTES
[facility]  [facility] [address] [facility], [address] [place] [address]  [phone] (phone)  [phone] (fax)    Patient ID: Francisco Bowen 1970  Referring Provider: JOCE Mahan CNP     Diabetes Mellitus Type 2, Initial Visit: Patient here for an initial evaluation of Type 2 diabetes mellitus. Current symptoms/problems include  fatigue  and have been unchanged. Symptoms have been present for several months. The patient was initially diagnosed with Type 2 diabetes mellitus since approx 2002. Known diabetic complications: nephropathy and retinopathy  Cardiovascular risk factors: diabetes mellitus, dyslipidemia, family history of premature cardiovascular disease, hypertension, male gender, obesity (BMI >= 30 kg/m2), sedentary lifestyle, and smoking/ tobacco exposure  Family history of diabetes: mom; mat grandma  Weight trend: Up 10 pounds in the past year. (Less active at work)    Current Diabetes Pharmacotherapy:  Jardiance 25mg daily  Actos 45mg daily  Lantus 40-45 units BID  Humalog 40 units bkfst and 40-45 dinner                 Lunch (if eating) 1hrpp 1 unit: 2mg glucose  Injection technique/storage: appropriate:  yes- abdomen    Glucose Trends:   Glucose at 2.25 hrs PPD today resulted at 172mg/dl  Current monitoring regimen: Fingerstick blood tests - 1 times daily; did not bring meter with him today  Home blood sugar trends:    -timing of SGM is random  Any episodes of hypoglycemia?  yes - s/s with BS 60's   -Treats with candy    Lifestyle Factors:   Previous visit with dietician: remote-over 10 years ago  Current diet: B: 5-6am no breakfast                               Ice coffee--milk 2-3T            L: skips 80%                       11a-1p handful of almonds or protein bar                       D: 5:30pm 2 bologna sandwich (4 bread)/ chips/                                                   water                                    Hamburger (2 bread) / baked potato

## 2023-07-06 NOTE — PROGRESS NOTES
Salina Lockwood presents for initiation of Dexcom personal CGM. Referring provider: JOCE Mahan CNP    Dexcom CGM instructions completed. Dexcom CGM initiated back of Left Arm (NEEDS TO BE BACK OF UPPER ARM). 30 minute warm up period initiated. Urgent low warning set at 80  Urgent high warning set at 250  System is waterproof-do not need to do anything for showers. You should use the \"overpatch\" that comes supplied in the box with the Dexcom sensor to help keep sensor in place for 10 days. Warm up period: 30 minute until your sensor will start recording blood sugar results  Watch the arrows --this arrow indicates if your blood sugars are trending up, trending down or    staying stable. If the Elim IRA is red= indicates a sugar in low range                                                Elenore Palin = indicates a sugar in range                                                Orange =indicates a sugar above goal range  Change sensor every 10 days  Contact [employer] [phone] for assistance.   Monday- Friday 5:30am-8pm PST

## 2023-07-06 NOTE — PATIENT INSTRUCTIONS
Check your blood sugar at least every morning waking up       And as needed during the day     Recommended: before each meal and bedtime       For now: blood sugar goal 100-160  Think about eating breakfast                And limit carbohydrates to 15-30 grams   Stick to almonds during the night  Try to get 10-15 minutes of exercise after eating supper            -taking a walk            -swimming  We will check on CGM--Dexcom G7

## 2023-07-17 LAB
AVERAGE GLUCOSE: 252 MG/DL
HBA1C MFR BLD: 10.4 % (ref 4.2–5.6)

## 2023-08-03 ENCOUNTER — TELEPHONE (OUTPATIENT)
Dept: INTERNAL MEDICINE CLINIC | Age: 53
End: 2023-08-03

## 2023-08-03 NOTE — TELEPHONE ENCOUNTER
Call from [facility] asking about status of Dexcom supply order.  Message sent to [id number] in [place] regarding status of order

## 2023-08-07 NOTE — TELEPHONE ENCOUNTER
Looked at [place], I was told that \"the prior Halle Barroso is pending with his [facility]. We called and checked on it Friday. Hopefully a decision is forthcoming. \"    I called patient to let him know and he stated that he was advised of this. He will use his blood sugar meter until he gets his CGM supplies. I informed patient if he needs any further assistance, to let us know. Patient verbalized understanding.

## 2023-08-24 DIAGNOSIS — E78.2 MIXED HYPERLIPIDEMIA: ICD-10-CM

## 2023-08-24 RX ORDER — FENOFIBRATE 145 MG/1
145 TABLET, COATED ORAL DAILY
Qty: 90 TABLET | Refills: 3 | Status: SHIPPED | OUTPATIENT
Start: 2023-08-24

## 2023-09-20 ENCOUNTER — COMMUNITY OUTREACH (OUTPATIENT)
Dept: FAMILY MEDICINE CLINIC | Age: 53
End: 2023-09-20

## 2023-10-14 DIAGNOSIS — E78.2 MIXED HYPERLIPIDEMIA: ICD-10-CM

## 2023-10-16 RX ORDER — ATORVASTATIN CALCIUM 80 MG/1
80 TABLET, FILM COATED ORAL DAILY
Qty: 120 TABLET | Refills: 2 | Status: SHIPPED | OUTPATIENT
Start: 2023-10-16 | End: 2023-11-27 | Stop reason: SDUPTHER

## 2023-10-24 DIAGNOSIS — E11.65 TYPE 2 DIABETES MELLITUS WITH HYPERGLYCEMIA, WITH LONG-TERM CURRENT USE OF INSULIN (HCC): ICD-10-CM

## 2023-10-24 DIAGNOSIS — Z79.4 TYPE 2 DIABETES MELLITUS WITH HYPERGLYCEMIA, WITH LONG-TERM CURRENT USE OF INSULIN (HCC): ICD-10-CM

## 2023-10-24 DIAGNOSIS — I10 ESSENTIAL HYPERTENSION: ICD-10-CM

## 2023-10-24 RX ORDER — VALSARTAN 160 MG/1
160 TABLET ORAL DAILY
Qty: 120 TABLET | Refills: 2 | Status: SHIPPED | OUTPATIENT
Start: 2023-10-24

## 2023-11-27 ENCOUNTER — OFFICE VISIT (OUTPATIENT)
Dept: FAMILY MEDICINE CLINIC | Age: 53
End: 2023-11-27
Payer: COMMERCIAL

## 2023-11-27 VITALS
HEART RATE: 80 BPM | HEIGHT: 72 IN | WEIGHT: 277.8 LBS | BODY MASS INDEX: 37.63 KG/M2 | RESPIRATION RATE: 16 BRPM | DIASTOLIC BLOOD PRESSURE: 70 MMHG | SYSTOLIC BLOOD PRESSURE: 126 MMHG

## 2023-11-27 DIAGNOSIS — E11.9 INSULIN DEPENDENT TYPE 2 DIABETES MELLITUS (HCC): ICD-10-CM

## 2023-11-27 DIAGNOSIS — N18.31 STAGE 3A CHRONIC KIDNEY DISEASE (HCC): ICD-10-CM

## 2023-11-27 DIAGNOSIS — Z79.4 TYPE 2 DIABETES MELLITUS WITH HYPERGLYCEMIA, WITH LONG-TERM CURRENT USE OF INSULIN (HCC): Primary | ICD-10-CM

## 2023-11-27 DIAGNOSIS — Z79.4 INSULIN DEPENDENT TYPE 2 DIABETES MELLITUS (HCC): ICD-10-CM

## 2023-11-27 DIAGNOSIS — I10 ESSENTIAL HYPERTENSION: ICD-10-CM

## 2023-11-27 DIAGNOSIS — E66.1 CLASS 2 DRUG-INDUCED OBESITY WITH SERIOUS COMORBIDITY AND BODY MASS INDEX (BMI) OF 37.0 TO 37.9 IN ADULT: ICD-10-CM

## 2023-11-27 DIAGNOSIS — E11.65 TYPE 2 DIABETES MELLITUS WITH HYPERGLYCEMIA, WITH LONG-TERM CURRENT USE OF INSULIN (HCC): Primary | ICD-10-CM

## 2023-11-27 DIAGNOSIS — E78.2 MIXED HYPERLIPIDEMIA: ICD-10-CM

## 2023-11-27 PROCEDURE — 3074F SYST BP LT 130 MM HG: CPT | Performed by: NURSE PRACTITIONER

## 2023-11-27 PROCEDURE — 3017F COLORECTAL CA SCREEN DOC REV: CPT | Performed by: NURSE PRACTITIONER

## 2023-11-27 PROCEDURE — 3078F DIAST BP <80 MM HG: CPT | Performed by: NURSE PRACTITIONER

## 2023-11-27 PROCEDURE — 99214 OFFICE O/P EST MOD 30 MIN: CPT | Performed by: NURSE PRACTITIONER

## 2023-11-27 PROCEDURE — 4004F PT TOBACCO SCREEN RCVD TLK: CPT | Performed by: NURSE PRACTITIONER

## 2023-11-27 PROCEDURE — G8427 DOCREV CUR MEDS BY ELIG CLIN: HCPCS | Performed by: NURSE PRACTITIONER

## 2023-11-27 PROCEDURE — 2022F DILAT RTA XM EVC RTNOPTHY: CPT | Performed by: NURSE PRACTITIONER

## 2023-11-27 PROCEDURE — 3046F HEMOGLOBIN A1C LEVEL >9.0%: CPT | Performed by: NURSE PRACTITIONER

## 2023-11-27 PROCEDURE — G8484 FLU IMMUNIZE NO ADMIN: HCPCS | Performed by: NURSE PRACTITIONER

## 2023-11-27 PROCEDURE — G8417 CALC BMI ABV UP PARAM F/U: HCPCS | Performed by: NURSE PRACTITIONER

## 2023-11-27 RX ORDER — INSULIN GLARGINE 100 [IU]/ML
INJECTION, SOLUTION SUBCUTANEOUS
Qty: 120 ML | Refills: 3 | Status: SHIPPED | OUTPATIENT
Start: 2023-11-27

## 2023-11-27 RX ORDER — ATORVASTATIN CALCIUM 80 MG/1
80 TABLET, FILM COATED ORAL DAILY
Qty: 120 TABLET | Refills: 2 | Status: SHIPPED | OUTPATIENT
Start: 2023-11-27

## 2023-11-27 RX ORDER — ASPIRIN 81 MG/1
81 TABLET ORAL DAILY
Qty: 90 TABLET | Refills: 3 | Status: SHIPPED | OUTPATIENT
Start: 2023-11-27

## 2023-11-27 ASSESSMENT — ENCOUNTER SYMPTOMS
SHORTNESS OF BREATH: 0
NAUSEA: 0
ABDOMINAL PAIN: 0
COUGH: 0

## 2023-11-27 NOTE — PROGRESS NOTES
equal, round, and reactive to light. Cardiovascular:      Rate and Rhythm: Normal rate and regular rhythm. Heart sounds: No murmur heard. Pulmonary:      Effort: Pulmonary effort is normal.      Breath sounds: Normal breath sounds. No wheezing. Abdominal:      General: Bowel sounds are normal. There is no distension. Palpations: Abdomen is soft. Tenderness: There is no abdominal tenderness. Musculoskeletal:         General: No tenderness. Normal range of motion. Cervical back: Normal range of motion and neck supple. Skin:     General: Skin is warm and dry. Findings: No rash. Assessment:       Diagnosis Orders   1. Type 2 diabetes mellitus with hyperglycemia, with long-term current use of insulin ([facility])  insulin glargine (LANTUS SOLOSTAR) 100 UNIT/ML injection pen    aspirin (ASPIRIN LOW DOSE) 81 MG EC tablet    Hemoglobin A1c    Microalbumin / creatinine urine ratio    Basic Metabolic Panel      2. Insulin dependent type 2 diabetes mellitus ([address])        3. Mixed hyperlipidemia  atorvastatin (LIPITOR) 80 MG tablet      4. Essential hypertension        5.  Stage 3a chronic kidney disease (HCC)        6. Class 2 drug-induced obesity with serious comorbidity and body mass index (BMI) of 37.0 to 37.9 in adult                Plan:      Chronic conditions stable  Labs as above to monitor  CGM giving him more control of BS   - home BS running good  Diet, exercise and weight loss stress  CRS options discussed - colonoscopy card given  Follow up with Specialists  Immunizations discussed  RTO in 6 months           Current Outpatient Medications   Medication Sig Dispense Refill    insulin glargine (LANTUS SOLOSTAR) 100 UNIT/ML injection pen INJECT SUBCUTANEOUSLY 45  UNITS TWICE DAILY 120 mL 3    atorvastatin (LIPITOR) 80 MG tablet Take 1 tablet by mouth daily 120 tablet 2    aspirin (ASPIRIN LOW DOSE) 81 MG EC tablet Take 1 tablet by mouth daily 90 tablet 3    valsartan (DIOVAN) 160 MG

## 2024-01-25 DIAGNOSIS — F34.1 DYSTHYMIA: ICD-10-CM

## 2024-01-31 RX ORDER — PIOGLITAZONEHYDROCHLORIDE 45 MG/1
TABLET ORAL DAILY
Qty: 120 TABLET | Refills: 2 | Status: SHIPPED | OUTPATIENT
Start: 2024-01-31

## 2024-05-04 DIAGNOSIS — Z79.4 TYPE 2 DIABETES MELLITUS WITHOUT COMPLICATION, WITH LONG-TERM CURRENT USE OF INSULIN (HCC): ICD-10-CM

## 2024-05-04 DIAGNOSIS — E11.9 TYPE 2 DIABETES MELLITUS WITHOUT COMPLICATION, WITH LONG-TERM CURRENT USE OF INSULIN (HCC): ICD-10-CM

## 2024-05-06 RX ORDER — INSULIN LISPRO 100 [IU]/ML
INJECTION, SOLUTION INTRAVENOUS; SUBCUTANEOUS
Qty: 120 ML | Refills: 2 | Status: SHIPPED | OUTPATIENT
Start: 2024-05-06

## 2024-05-07 ENCOUNTER — TELEPHONE (OUTPATIENT)
Dept: FAMILY MEDICINE CLINIC | Age: 54
End: 2024-05-07

## 2024-05-07 DIAGNOSIS — Z79.4 INSULIN DEPENDENT TYPE 2 DIABETES MELLITUS (HCC): ICD-10-CM

## 2024-05-07 DIAGNOSIS — E11.65 TYPE 2 DIABETES MELLITUS WITH HYPERGLYCEMIA, WITH LONG-TERM CURRENT USE OF INSULIN (HCC): ICD-10-CM

## 2024-05-07 DIAGNOSIS — Z79.4 TYPE 2 DIABETES MELLITUS WITHOUT COMPLICATION, WITH LONG-TERM CURRENT USE OF INSULIN (HCC): Primary | ICD-10-CM

## 2024-05-07 DIAGNOSIS — E11.9 TYPE 2 DIABETES MELLITUS WITHOUT COMPLICATION, WITH LONG-TERM CURRENT USE OF INSULIN (HCC): Primary | ICD-10-CM

## 2024-05-07 DIAGNOSIS — Z79.4 TYPE 2 DIABETES MELLITUS WITH HYPERGLYCEMIA, WITH LONG-TERM CURRENT USE OF INSULIN (HCC): ICD-10-CM

## 2024-05-07 DIAGNOSIS — E11.9 INSULIN DEPENDENT TYPE 2 DIABETES MELLITUS (HCC): ICD-10-CM

## 2024-05-07 RX ORDER — ACYCLOVIR 400 MG/1
TABLET ORAL
Qty: 3 EACH | Refills: 11 | Status: SHIPPED | OUTPATIENT
Start: 2024-05-07

## 2024-05-07 RX ORDER — ACYCLOVIR 400 MG/1
TABLET ORAL
Qty: 1 EACH | Refills: 0 | Status: SHIPPED | OUTPATIENT
Start: 2024-05-07

## 2024-05-26 LAB
ANION GAP SERPL CALCULATED.3IONS-SCNC: 13 MEQ/L (ref 7–16)
BUN BLDV-MCNC: 36 MG/DL (ref 6–20)
CALCIUM SERPL-MCNC: 10.3 MG/DL (ref 8.5–10.5)
CHLORIDE BLD-SCNC: 100 MEQ/L (ref 95–107)
CO2: 25 MEQ/L (ref 19–31)
CREAT SERPL-MCNC: 1.63 MG/DL (ref 0.8–1.4)
CREATINE, URINE: 58.3 MG/DL
EGFR IF NONAFRICAN AMERICAN: 50 ML/MIN/1.73
GLUCOSE: 201 MG/DL (ref 70–99)
MICROALBUMIN/CREAT 24H UR: 52.2 MG/DL
MICROALBUMIN/CREAT UR-RTO: 895 MG/G
POTASSIUM SERPL-SCNC: 5 MEQ/L (ref 3.5–5.4)
SODIUM BLD-SCNC: 138 MEQ/L (ref 133–146)

## 2024-05-28 ENCOUNTER — OFFICE VISIT (OUTPATIENT)
Dept: FAMILY MEDICINE CLINIC | Age: 54
End: 2024-05-28
Payer: COMMERCIAL

## 2024-05-28 VITALS
SYSTOLIC BLOOD PRESSURE: 122 MMHG | DIASTOLIC BLOOD PRESSURE: 66 MMHG | RESPIRATION RATE: 16 BRPM | HEART RATE: 80 BPM | BODY MASS INDEX: 37.96 KG/M2 | WEIGHT: 280.3 LBS | HEIGHT: 72 IN

## 2024-05-28 DIAGNOSIS — E11.9 INSULIN DEPENDENT TYPE 2 DIABETES MELLITUS (HCC): ICD-10-CM

## 2024-05-28 DIAGNOSIS — I10 ESSENTIAL HYPERTENSION: ICD-10-CM

## 2024-05-28 DIAGNOSIS — N17.9 AKI (ACUTE KIDNEY INJURY) (HCC): ICD-10-CM

## 2024-05-28 DIAGNOSIS — Z79.4 INSULIN DEPENDENT TYPE 2 DIABETES MELLITUS (HCC): ICD-10-CM

## 2024-05-28 DIAGNOSIS — Z79.4 TYPE 2 DIABETES MELLITUS WITHOUT COMPLICATION, WITH LONG-TERM CURRENT USE OF INSULIN (HCC): ICD-10-CM

## 2024-05-28 DIAGNOSIS — E11.9 TYPE 2 DIABETES MELLITUS WITHOUT COMPLICATION, WITH LONG-TERM CURRENT USE OF INSULIN (HCC): ICD-10-CM

## 2024-05-28 DIAGNOSIS — Z00.00 WELL ADULT EXAM: Primary | ICD-10-CM

## 2024-05-28 DIAGNOSIS — N18.31 STAGE 3A CHRONIC KIDNEY DISEASE (HCC): ICD-10-CM

## 2024-05-28 DIAGNOSIS — E66.01 CLASS 2 SEVERE OBESITY DUE TO EXCESS CALORIES WITH SERIOUS COMORBIDITY AND BODY MASS INDEX (BMI) OF 37.0 TO 37.9 IN ADULT (HCC): ICD-10-CM

## 2024-05-28 DIAGNOSIS — E78.2 MIXED HYPERLIPIDEMIA: ICD-10-CM

## 2024-05-28 LAB
ESTIMATED AVERAGE GLUCOSE: 197 MG/DL
HBA1C MFR BLD: 8.5 % (ref 4.2–5.6)

## 2024-05-28 PROCEDURE — 99396 PREV VISIT EST AGE 40-64: CPT | Performed by: NURSE PRACTITIONER

## 2024-05-28 PROCEDURE — 3074F SYST BP LT 130 MM HG: CPT | Performed by: NURSE PRACTITIONER

## 2024-05-28 PROCEDURE — 3078F DIAST BP <80 MM HG: CPT | Performed by: NURSE PRACTITIONER

## 2024-05-28 SDOH — ECONOMIC STABILITY: INCOME INSECURITY: HOW HARD IS IT FOR YOU TO PAY FOR THE VERY BASICS LIKE FOOD, HOUSING, MEDICAL CARE, AND HEATING?: NOT HARD AT ALL

## 2024-05-28 SDOH — ECONOMIC STABILITY: FOOD INSECURITY: WITHIN THE PAST 12 MONTHS, YOU WORRIED THAT YOUR FOOD WOULD RUN OUT BEFORE YOU GOT MONEY TO BUY MORE.: NEVER TRUE

## 2024-05-28 SDOH — ECONOMIC STABILITY: FOOD INSECURITY: WITHIN THE PAST 12 MONTHS, THE FOOD YOU BOUGHT JUST DIDN'T LAST AND YOU DIDN'T HAVE MONEY TO GET MORE.: NEVER TRUE

## 2024-05-28 ASSESSMENT — ENCOUNTER SYMPTOMS
SHORTNESS OF BREATH: 0
COUGH: 0
ABDOMINAL PAIN: 0
NAUSEA: 0

## 2024-05-28 ASSESSMENT — PATIENT HEALTH QUESTIONNAIRE - PHQ9
SUM OF ALL RESPONSES TO PHQ QUESTIONS 1-9: 0
5. POOR APPETITE OR OVEREATING: NOT AT ALL
6. FEELING BAD ABOUT YOURSELF - OR THAT YOU ARE A FAILURE OR HAVE LET YOURSELF OR YOUR FAMILY DOWN: NOT AT ALL
8. MOVING OR SPEAKING SO SLOWLY THAT OTHER PEOPLE COULD HAVE NOTICED. OR THE OPPOSITE, BEING SO FIGETY OR RESTLESS THAT YOU HAVE BEEN MOVING AROUND A LOT MORE THAN USUAL: NOT AT ALL
10. IF YOU CHECKED OFF ANY PROBLEMS, HOW DIFFICULT HAVE THESE PROBLEMS MADE IT FOR YOU TO DO YOUR WORK, TAKE CARE OF THINGS AT HOME, OR GET ALONG WITH OTHER PEOPLE: NOT DIFFICULT AT ALL
2. FEELING DOWN, DEPRESSED OR HOPELESS: NOT AT ALL
9. THOUGHTS THAT YOU WOULD BE BETTER OFF DEAD, OR OF HURTING YOURSELF: NOT AT ALL
7. TROUBLE CONCENTRATING ON THINGS, SUCH AS READING THE NEWSPAPER OR WATCHING TELEVISION: NOT AT ALL
4. FEELING TIRED OR HAVING LITTLE ENERGY: NOT AT ALL
SUM OF ALL RESPONSES TO PHQ QUESTIONS 1-9: 0
1. LITTLE INTEREST OR PLEASURE IN DOING THINGS: NOT AT ALL
3. TROUBLE FALLING OR STAYING ASLEEP: NOT AT ALL
SUM OF ALL RESPONSES TO PHQ QUESTIONS 1-9: 0
SUM OF ALL RESPONSES TO PHQ QUESTIONS 1-9: 0
SUM OF ALL RESPONSES TO PHQ9 QUESTIONS 1 & 2: 0

## 2024-05-28 NOTE — PROGRESS NOTES
Subjective:      Patient ID: Sterling Ennis 1970 is a 53 y.o. male here for evaluation.     Chief Complaint   Patient presents with    6 Month Follow-Up    Diabetes    Skin Problem     Right side of neck, thought it was a pimple        Patient Active Problem List   Diagnosis    DM (diabetes mellitus) (HCC)    HTN (hypertension)    Hyperlipidemia    Medication monitoring encounter    Callus of foot, bilateral    Hypercalcemia    Allergic rhinitis       COLONOSCOPY - due    RENAL - Dr. Mccloud    Denies CP, SOB or chest tightness    Mood stable on Zoloft 50 mg.     On Diovan 160 mg.     BP Readings from Last 3 Encounters:   05/28/24 122/66   11/27/23 126/70   07/06/23 125/76     Wt Readings from Last 3 Encounters:   05/28/24 127.1 kg (280 lb 4.8 oz)   11/27/23 126 kg (277 lb 12.8 oz)   07/06/23 124.5 kg (274 lb 6.4 oz)       On Jardiance 25 mg, Actos 30 mg, Lantus 45 units BID and Humalog SS.      Metformin SE intolerance.     Has CGM - had been off it x 6 weeks and prior to that A1C accoring to CGM was 7.4.   Sugars averaging 174 over 90 days.   CGM allowing him to better control his sugars.  Off the line at work so is able to be more compliant with insulin.     Lab Results   Component Value Date    LABA1C 8.5 (H) 05/25/2024    LABA1C 10.4 (H) 07/15/2023    LABA1C 10.9 (H) 08/20/2022     Lab Results   Component Value Date     (H) 05/25/2024       On Fenofibrate 160 mg and Atorvastatin 80 mg Daily.     No components found for: \"CHLPL\"  Lab Results   Component Value Date    TRIG 212 (H) 07/15/2023    TRIG 502 (H) 08/20/2022    TRIG 537 (H) 03/21/2022     Lab Results   Component Value Date    HDL 40 07/15/2023    HDL 35 (L) 08/20/2022    HDL 39 (L) 03/21/2022     No components found for: \"LDLCALC\"    No components found for: \"LABVLDL\"        Chemistry        Component Value Date/Time     05/25/2024 0915    K 5.0 05/25/2024 0915     05/25/2024 0915    CO2 25 05/25/2024 0915    BUN 36 (H) 05/25/2024

## 2024-05-29 DIAGNOSIS — E11.9 TYPE 2 DIABETES MELLITUS WITHOUT COMPLICATION, WITH LONG-TERM CURRENT USE OF INSULIN (HCC): ICD-10-CM

## 2024-05-29 DIAGNOSIS — Z79.4 TYPE 2 DIABETES MELLITUS WITHOUT COMPLICATION, WITH LONG-TERM CURRENT USE OF INSULIN (HCC): ICD-10-CM

## 2024-05-30 RX ORDER — EMPAGLIFLOZIN 25 MG/1
TABLET, FILM COATED ORAL
Qty: 120 TABLET | Refills: 2 | Status: SHIPPED | OUTPATIENT
Start: 2024-05-30

## 2024-06-28 DIAGNOSIS — E78.2 MIXED HYPERLIPIDEMIA: ICD-10-CM

## 2024-07-01 RX ORDER — FENOFIBRATE 145 MG/1
145 TABLET, COATED ORAL DAILY
Qty: 120 TABLET | Refills: 2 | Status: SHIPPED | OUTPATIENT
Start: 2024-07-01

## 2024-08-06 DIAGNOSIS — I10 ESSENTIAL HYPERTENSION: ICD-10-CM

## 2024-08-06 DIAGNOSIS — E11.65 TYPE 2 DIABETES MELLITUS WITH HYPERGLYCEMIA, WITH LONG-TERM CURRENT USE OF INSULIN (HCC): ICD-10-CM

## 2024-08-06 DIAGNOSIS — Z79.4 TYPE 2 DIABETES MELLITUS WITH HYPERGLYCEMIA, WITH LONG-TERM CURRENT USE OF INSULIN (HCC): ICD-10-CM

## 2024-08-06 RX ORDER — VALSARTAN 160 MG/1
160 TABLET ORAL DAILY
Qty: 120 TABLET | Refills: 2 | Status: SHIPPED | OUTPATIENT
Start: 2024-08-06

## 2024-12-16 DIAGNOSIS — E78.2 MIXED HYPERLIPIDEMIA: ICD-10-CM

## 2024-12-17 RX ORDER — ATORVASTATIN CALCIUM 80 MG/1
80 TABLET, FILM COATED ORAL DAILY
Qty: 120 TABLET | Refills: 2 | Status: SHIPPED | OUTPATIENT
Start: 2024-12-17

## 2024-12-23 DIAGNOSIS — F34.1 DYSTHYMIA: ICD-10-CM

## 2024-12-26 RX ORDER — PIOGLITAZONE 45 MG/1
TABLET ORAL DAILY
Qty: 120 TABLET | Refills: 2 | Status: SHIPPED | OUTPATIENT
Start: 2024-12-26

## 2025-01-30 DIAGNOSIS — Z79.4 TYPE 2 DIABETES MELLITUS WITH HYPERGLYCEMIA, WITH LONG-TERM CURRENT USE OF INSULIN (HCC): ICD-10-CM

## 2025-01-30 DIAGNOSIS — E11.65 TYPE 2 DIABETES MELLITUS WITH HYPERGLYCEMIA, WITH LONG-TERM CURRENT USE OF INSULIN (HCC): ICD-10-CM

## 2025-01-30 RX ORDER — ASPIRIN 81 MG/1
81 TABLET, COATED ORAL DAILY
Qty: 120 TABLET | Refills: 2 | Status: SHIPPED | OUTPATIENT
Start: 2025-01-30

## 2025-02-16 DIAGNOSIS — E11.65 TYPE 2 DIABETES MELLITUS WITH HYPERGLYCEMIA, WITH LONG-TERM CURRENT USE OF INSULIN (HCC): ICD-10-CM

## 2025-02-16 DIAGNOSIS — Z79.4 TYPE 2 DIABETES MELLITUS WITH HYPERGLYCEMIA, WITH LONG-TERM CURRENT USE OF INSULIN (HCC): ICD-10-CM

## 2025-02-17 RX ORDER — INSULIN GLARGINE 100 [IU]/ML
INJECTION, SOLUTION SUBCUTANEOUS
Qty: 120 ML | Refills: 2 | Status: SHIPPED | OUTPATIENT
Start: 2025-02-17

## 2025-03-16 ENCOUNTER — RESULTS FOLLOW-UP (OUTPATIENT)
Dept: FAMILY MEDICINE CLINIC | Age: 55
End: 2025-03-16

## 2025-03-16 LAB
ALBUMIN: 4.1 G/DL (ref 3.5–5.2)
ALK PHOSPHATASE: 86 U/L (ref 39–118)
ALT SERPL-CCNC: 30 U/L (ref 5–41)
ANION GAP SERPL CALCULATED.3IONS-SCNC: 17 MMOL/L (ref 7–16)
AST SERPL-CCNC: 17 U/L (ref 9–50)
BASOPHILS ABSOLUTE: 0.05 K/UL (ref 0–0.2)
BASOPHILS RELATIVE PERCENT: 0.6 % (ref 0–2)
BILIRUB SERPL-MCNC: 0.3 MG/DL
BUN BLDV-MCNC: 29 MG/DL (ref 6–20)
CALCIUM SERPL-MCNC: 9.2 MG/DL (ref 8.6–10.5)
CHLORIDE BLD-SCNC: 100 MMOL/L (ref 96–107)
CHOLESTEROL, TOTAL: 171 MG/DL (ref 100–199)
CHOLESTEROL/HDL RATIO: 4.8 (ref 2–4.5)
CO2: 22 MMOL/L (ref 18–32)
CREAT SERPL-MCNC: 1.47 MG/DL (ref 0.67–1.3)
EGFR IF NONAFRICAN AMERICAN: 56 ML/MIN/1.73M2
EOSINOPHILS ABSOLUTE: 0.19 K/UL (ref 0–0.8)
EOSINOPHILS RELATIVE PERCENT: 2.1 % (ref 0–5)
ESTIMATED AVERAGE GLUCOSE: 189 MG/DL
GLUCOSE: 153 MG/DL (ref 70–100)
HBA1C MFR BLD: 8.2 %
HCT VFR BLD CALC: 46.7 % (ref 39–52)
HDLC SERPL-MCNC: 36 MG/DL
HEMOGLOBIN: 14.9 G/DL (ref 13–18)
IMMATURE GRANS (ABS): 0.02 K/UL (ref 0–0.06)
IMMATURE GRANULOCYTES %: 0.2 % (ref 0–2)
LDL CHOLESTEROL: 98 MG/DL
LDL/HDL RATIO: 2.7
LYMPHOCYTES ABSOLUTE: 2.46 K/UL (ref 0.9–5.2)
LYMPHOCYTES RELATIVE PERCENT: 27.5 % (ref 20–45)
MCH RBC QN AUTO: 28 PG (ref 26–32)
MCHC RBC AUTO-ENTMCNC: 31.9 G/DL (ref 32–35)
MCV RBC AUTO: 88 FL (ref 75–100)
MONOCYTES ABSOLUTE: 0.6 K/UL (ref 0.1–1)
MONOCYTES RELATIVE PERCENT: 6.7 % (ref 0–13)
NEUTROPHILS ABSOLUTE: 5.63 K/UL (ref 1.9–8)
NEUTROPHILS RELATIVE PERCENT: 62.9 % (ref 45–75)
PDW BLD-RTO: 12.5 % (ref 11.2–14.8)
PLATELET # BLD: 442 THOUS/CMM (ref 140–440)
POTASSIUM SERPL-SCNC: 4.4 MMOL/L (ref 3.5–5.4)
RBC # BLD: 5.32 MILL/CMM (ref 4.4–6.1)
SODIUM BLD-SCNC: 139 MMOL/L (ref 135–148)
TOTAL PROTEIN: 6.8 G/DL (ref 6–8.3)
TRIGL SERPL-MCNC: 186 MG/DL (ref 20–149)
TSH SERPL DL<=0.05 MIU/L-ACNC: 0.86 UIU/ML (ref 0.27–4.2)
VLDLC SERPL CALC-MCNC: 37 MG/DL
WBC # BLD: 9 THDS/CMM (ref 3.6–11)

## 2025-03-17 NOTE — TELEPHONE ENCOUNTER
Attempted to contact patient to schedule a yearly visit to discuss lab results. Received Voicemail. Message left.

## 2025-04-17 ENCOUNTER — TELEPHONE (OUTPATIENT)
Dept: FAMILY MEDICINE CLINIC | Age: 55
End: 2025-04-17

## 2025-04-17 NOTE — TELEPHONE ENCOUNTER
Received a fax from [facility] stating that they need records for patient continuous glucose monitor supplies. Fax states the patients insurance plan requires that [place] [employer] submit updated medical records every 6 months.     Called patient and left a message to call the office to schedule 6 month follow up last seen patient 5/28/2024

## 2025-04-25 ENCOUNTER — COMMUNITY OUTREACH (OUTPATIENT)
Dept: FAMILY MEDICINE CLINIC | Age: 55
End: 2025-04-25

## 2025-05-01 DIAGNOSIS — Z79.4 TYPE 2 DIABETES MELLITUS WITHOUT COMPLICATION, WITH LONG-TERM CURRENT USE OF INSULIN (HCC): ICD-10-CM

## 2025-05-01 DIAGNOSIS — E11.9 TYPE 2 DIABETES MELLITUS WITHOUT COMPLICATION, WITH LONG-TERM CURRENT USE OF INSULIN (HCC): ICD-10-CM

## 2025-05-02 RX ORDER — EMPAGLIFLOZIN 25 MG/1
25 TABLET, FILM COATED ORAL DAILY
Qty: 120 TABLET | Refills: 2 | Status: SHIPPED | OUTPATIENT
Start: 2025-05-02

## 2025-06-10 ENCOUNTER — OFFICE VISIT (OUTPATIENT)
Dept: FAMILY MEDICINE CLINIC | Age: 55
End: 2025-06-10
Payer: COMMERCIAL

## 2025-06-10 VITALS
SYSTOLIC BLOOD PRESSURE: 120 MMHG | HEIGHT: 72 IN | RESPIRATION RATE: 16 BRPM | DIASTOLIC BLOOD PRESSURE: 72 MMHG | WEIGHT: 289 LBS | BODY MASS INDEX: 39.14 KG/M2 | HEART RATE: 76 BPM

## 2025-06-10 DIAGNOSIS — E11.65 TYPE 2 DIABETES MELLITUS WITH HYPERGLYCEMIA, WITH LONG-TERM CURRENT USE OF INSULIN (HCC): ICD-10-CM

## 2025-06-10 DIAGNOSIS — N18.31 STAGE 3A CHRONIC KIDNEY DISEASE (HCC): ICD-10-CM

## 2025-06-10 DIAGNOSIS — I10 ESSENTIAL HYPERTENSION: ICD-10-CM

## 2025-06-10 DIAGNOSIS — E11.9 INSULIN DEPENDENT TYPE 2 DIABETES MELLITUS (HCC): ICD-10-CM

## 2025-06-10 DIAGNOSIS — Z00.00 WELL ADULT EXAM: Primary | ICD-10-CM

## 2025-06-10 DIAGNOSIS — E78.2 MIXED HYPERLIPIDEMIA: ICD-10-CM

## 2025-06-10 DIAGNOSIS — Z79.4 INSULIN DEPENDENT TYPE 2 DIABETES MELLITUS (HCC): ICD-10-CM

## 2025-06-10 DIAGNOSIS — Z79.4 TYPE 2 DIABETES MELLITUS WITH HYPERGLYCEMIA, WITH LONG-TERM CURRENT USE OF INSULIN (HCC): ICD-10-CM

## 2025-06-10 PROCEDURE — 3078F DIAST BP <80 MM HG: CPT | Performed by: NURSE PRACTITIONER

## 2025-06-10 PROCEDURE — 99396 PREV VISIT EST AGE 40-64: CPT | Performed by: NURSE PRACTITIONER

## 2025-06-10 PROCEDURE — 3074F SYST BP LT 130 MM HG: CPT | Performed by: NURSE PRACTITIONER

## 2025-06-10 SDOH — ECONOMIC STABILITY: FOOD INSECURITY: WITHIN THE PAST 12 MONTHS, YOU WORRIED THAT YOUR FOOD WOULD RUN OUT BEFORE YOU GOT MONEY TO BUY MORE.: NEVER TRUE

## 2025-06-10 SDOH — ECONOMIC STABILITY: FOOD INSECURITY: WITHIN THE PAST 12 MONTHS, THE FOOD YOU BOUGHT JUST DIDN'T LAST AND YOU DIDN'T HAVE MONEY TO GET MORE.: NEVER TRUE

## 2025-06-10 ASSESSMENT — ENCOUNTER SYMPTOMS
COUGH: 0
ABDOMINAL PAIN: 0
SHORTNESS OF BREATH: 0
NAUSEA: 0

## 2025-06-10 ASSESSMENT — PATIENT HEALTH QUESTIONNAIRE - PHQ9
2. FEELING DOWN, DEPRESSED OR HOPELESS: NOT AT ALL
SUM OF ALL RESPONSES TO PHQ QUESTIONS 1-9: 0
SUM OF ALL RESPONSES TO PHQ QUESTIONS 1-9: 0
1. LITTLE INTEREST OR PLEASURE IN DOING THINGS: NOT AT ALL
SUM OF ALL RESPONSES TO PHQ QUESTIONS 1-9: 0
SUM OF ALL RESPONSES TO PHQ QUESTIONS 1-9: 0

## 2025-06-10 NOTE — PATIENT INSTRUCTIONS
You may receive a survey about your visit with us today. The feedback from our patients helps us identify what is working well and where the service to all patients can be enhanced. Thank you!     Tobacco Cessation Programs     Telephonic behavior modification  1-800-QUIT-NOW (798-3545)  Counseling service for those who are ready to quit using tobacco.    Available for uninsured [place] residents, Medicaid recipients, pregnant women, or patients whose health plans or employers are members of the [place] [employer]    Online behavior modification  http://[place].Quitlogix.org  Online support program to help patients through each step of the quitting process.  Available 24 hours a day 7 days a week.  Provides up to date researched based tool, step-by-step guides, and motivational messages.    Online behavior modification  www.lungusa.org/stop-smoking/how-to-quit  HelpLine: 1-800-LUNG-[place] (197-3897)  Email questions to:  questions@[employer].org   Website offers resources to help tobacco users figure out their reasons for quitting and then take the big step of quitting for good.    Hypnosis  Location: [address] [place] [address]  Contact: Armen Vickers, PhD at [phone]  Hypnosis for tobacco cessation  Cost $225 for the initial session and $175 for each session afterwards.  Most patients require 6-8 sessions.  There is the option to submit through the patient’s insurance.    Hypnosis and behavior modification  Location: [address] [place] [address]  Contact: David Varela, PhD at [phone]  Counseling and hypnosis for nicotine addition  Cost: For uninsured patients:  Please call above phone number  Cost for insured patients depends on patient’s insurance plan.    Behavior modification  Location: [facility], [address] [place] [address]  Contact: [phone]  Services include four one-on-one appointments between the patient and a respiratory therapist.  The four appointments

## 2025-06-10 NOTE — PROGRESS NOTES
Subjective:      Patient ID: Sterling Ennis 1970 is a 54 y.o. male here for evaluation.     Chief Complaint   Patient presents with    Annual Exam    Diabetes       Patient Active Problem List   Diagnosis    DM (diabetes mellitus) (HCC)    HTN (hypertension)    Hyperlipidemia    Medication monitoring encounter    Callus of foot, bilateral    Hypercalcemia    Allergic rhinitis       COLONOSCOPY - due    RENAL - Dr. Mccloud    Denies CP, SOB or chest tightness    Mood stable on Zoloft 50 mg.     On Diovan 160 mg.     BP Readings from Last 3 Encounters:   06/10/25 120/72   05/28/24 122/66   11/27/23 126/70     Wt Readings from Last 3 Encounters:   06/10/25 131.1 kg (289 lb)   05/28/24 127.1 kg (280 lb 4.8 oz)   11/27/23 126 kg (277 lb 12.8 oz)       On Jardiance 25 mg, Actos 30 mg, Lantus 40 in AM and 45 units HS and Humalog SS.      Metformin SE intolerance.     Has CGM - had been off it x 12 weeks due to insurance hold up.  Prior to that A1C accoring to CGM was 7.6.     CGM allowing him to better control his sugars.  Off the line at work so is able to be more compliant with insulin.     Lab Results   Component Value Date    LABA1C 8.2 (H) 03/15/2025    LABA1C 8.5 (H) 05/25/2024    LABA1C 10.4 (H) 07/15/2023     Lab Results   Component Value Date     (H) 03/15/2025       On Fenofibrate 160 mg and Atorvastatin 80 mg Daily.     No components found for: \"CHLPL\"  Lab Results   Component Value Date    TRIG 186 (H) 03/15/2025    TRIG 212 (H) 07/15/2023    TRIG 502 (H) 08/20/2022     Lab Results   Component Value Date    HDL 36 (L) 03/15/2025    HDL 40 07/15/2023    HDL 35 (L) 08/20/2022     No components found for: \"LDLCALC\"    No components found for: \"LABVLDL\"        Chemistry        Component Value Date/Time     03/15/2025 1038    K 4.4 03/15/2025 1038     03/15/2025 1038    CO2 22 03/15/2025 1038    BUN 29 (H) 03/15/2025 1038    CREATININE 1.47 (H) 03/15/2025 1038        Component Value Date/Time

## 2025-06-13 DIAGNOSIS — E78.2 MIXED HYPERLIPIDEMIA: ICD-10-CM

## 2025-06-15 RX ORDER — FENOFIBRATE 145 MG/1
145 TABLET, FILM COATED ORAL DAILY
Qty: 120 TABLET | Refills: 2 | Status: SHIPPED | OUTPATIENT
Start: 2025-06-15

## 2025-06-23 DIAGNOSIS — Z79.4 TYPE 2 DIABETES MELLITUS WITHOUT COMPLICATION, WITH LONG-TERM CURRENT USE OF INSULIN (HCC): ICD-10-CM

## 2025-06-23 DIAGNOSIS — E11.9 INSULIN DEPENDENT TYPE 2 DIABETES MELLITUS (HCC): ICD-10-CM

## 2025-06-23 DIAGNOSIS — Z79.4 INSULIN DEPENDENT TYPE 2 DIABETES MELLITUS (HCC): ICD-10-CM

## 2025-06-23 DIAGNOSIS — E11.9 TYPE 2 DIABETES MELLITUS WITHOUT COMPLICATION, WITH LONG-TERM CURRENT USE OF INSULIN (HCC): ICD-10-CM

## 2025-06-23 DIAGNOSIS — Z79.4 TYPE 2 DIABETES MELLITUS WITH HYPERGLYCEMIA, WITH LONG-TERM CURRENT USE OF INSULIN (HCC): ICD-10-CM

## 2025-06-23 DIAGNOSIS — E11.65 TYPE 2 DIABETES MELLITUS WITH HYPERGLYCEMIA, WITH LONG-TERM CURRENT USE OF INSULIN (HCC): ICD-10-CM

## 2025-06-23 RX ORDER — ACYCLOVIR 400 MG/1
TABLET ORAL
Qty: 9 EACH | Refills: 3 | Status: SHIPPED | OUTPATIENT
Start: 2025-06-23

## 2025-07-28 ENCOUNTER — TELEPHONE (OUTPATIENT)
Dept: FAMILY MEDICINE CLINIC | Age: 55
End: 2025-07-28

## 2025-07-28 DIAGNOSIS — Z79.4 TYPE 2 DIABETES MELLITUS WITH HYPERGLYCEMIA, WITH LONG-TERM CURRENT USE OF INSULIN (HCC): Primary | ICD-10-CM

## 2025-07-28 DIAGNOSIS — E11.65 TYPE 2 DIABETES MELLITUS WITH HYPERGLYCEMIA, WITH LONG-TERM CURRENT USE OF INSULIN (HCC): Primary | ICD-10-CM

## 2025-07-28 NOTE — TELEPHONE ENCOUNTER
The patient called in requesting a refill on his Mounjaro, he is requesting the does be increased to 5mg and sent to [facility] [address]  Please advise      If no call back the patient will check with his pharmacy after 3:30pm   9

## 2025-08-12 DIAGNOSIS — Z79.4 TYPE 2 DIABETES MELLITUS WITH HYPERGLYCEMIA, WITH LONG-TERM CURRENT USE OF INSULIN (HCC): ICD-10-CM

## 2025-08-12 DIAGNOSIS — I10 ESSENTIAL HYPERTENSION: ICD-10-CM

## 2025-08-12 DIAGNOSIS — E11.65 TYPE 2 DIABETES MELLITUS WITH HYPERGLYCEMIA, WITH LONG-TERM CURRENT USE OF INSULIN (HCC): ICD-10-CM

## 2025-08-13 RX ORDER — VALSARTAN 160 MG/1
160 TABLET ORAL DAILY
Qty: 120 TABLET | Refills: 2 | Status: SHIPPED | OUTPATIENT
Start: 2025-08-13

## 2025-08-28 ENCOUNTER — TELEPHONE (OUTPATIENT)
Dept: FAMILY MEDICINE CLINIC | Age: 55
End: 2025-08-28

## 2025-08-28 DIAGNOSIS — E11.65 TYPE 2 DIABETES MELLITUS WITH HYPERGLYCEMIA, WITH LONG-TERM CURRENT USE OF INSULIN (HCC): Primary | ICD-10-CM

## 2025-08-28 DIAGNOSIS — Z79.4 TYPE 2 DIABETES MELLITUS WITH HYPERGLYCEMIA, WITH LONG-TERM CURRENT USE OF INSULIN (HCC): Primary | ICD-10-CM
